# Patient Record
Sex: MALE
[De-identification: names, ages, dates, MRNs, and addresses within clinical notes are randomized per-mention and may not be internally consistent; named-entity substitution may affect disease eponyms.]

---

## 2018-02-15 ENCOUNTER — HOSPITAL ENCOUNTER (EMERGENCY)
Dept: HOSPITAL 14 - H.ER | Age: 38
Discharge: HOME | End: 2018-02-15
Payer: MEDICARE

## 2018-02-15 VITALS
HEART RATE: 126 BPM | TEMPERATURE: 97 F | OXYGEN SATURATION: 97 % | RESPIRATION RATE: 16 BRPM | DIASTOLIC BLOOD PRESSURE: 92 MMHG | SYSTOLIC BLOOD PRESSURE: 140 MMHG

## 2018-02-15 DIAGNOSIS — E87.6: ICD-10-CM

## 2018-02-15 DIAGNOSIS — R20.2: Primary | ICD-10-CM

## 2018-02-15 DIAGNOSIS — I10: ICD-10-CM

## 2018-02-15 DIAGNOSIS — F41.9: ICD-10-CM

## 2018-02-15 DIAGNOSIS — F31.9: ICD-10-CM

## 2018-02-15 DIAGNOSIS — R94.5: ICD-10-CM

## 2018-02-15 LAB
ALBUMIN SERPL-MCNC: 5 G/DL (ref 3.5–5)
ALBUMIN/GLOB SERPL: 1.4 {RATIO} (ref 1–2.1)
ALT SERPL-CCNC: 97 U/L (ref 21–72)
APTT BLD: 28 SECONDS (ref 25.6–37.1)
AST SERPL-CCNC: 157 U/L (ref 17–59)
BASOPHILS # BLD AUTO: 0 K/UL (ref 0–0.2)
BASOPHILS NFR BLD: 0.4 % (ref 0–2)
BUN SERPL-MCNC: 12 MG/DL (ref 9–20)
CALCIUM SERPL-MCNC: 9.3 MG/DL (ref 8.4–10.2)
EOSINOPHIL # BLD AUTO: 0 K/UL (ref 0–0.7)
EOSINOPHIL NFR BLD: 0.5 % (ref 0–4)
ERYTHROCYTE [DISTWIDTH] IN BLOOD BY AUTOMATED COUNT: 13.3 % (ref 11.5–14.5)
GFR NON-AFRICAN AMERICAN: > 60
HGB BLD-MCNC: 15 G/DL (ref 12–18)
INR PPP: 1 (ref 0.9–1.2)
LYMPHOCYTES # BLD AUTO: 0.8 K/UL (ref 1–4.3)
LYMPHOCYTES NFR BLD AUTO: 11.1 % (ref 20–40)
MAGNESIUM SERPL-MCNC: 1.6 MG/DL (ref 1.6–2.3)
MCH RBC QN AUTO: 31.2 PG (ref 27–31)
MCHC RBC AUTO-ENTMCNC: 34.5 G/DL (ref 33–37)
MCV RBC AUTO: 90.2 FL (ref 80–94)
MONOCYTES # BLD: 0.8 K/UL (ref 0–0.8)
MONOCYTES NFR BLD: 11.3 % (ref 0–10)
NEUTROPHILS # BLD: 5.8 K/UL (ref 1.8–7)
NEUTROPHILS NFR BLD AUTO: 76.7 % (ref 50–75)
NRBC BLD AUTO-RTO: 0.1 % (ref 0–0)
PLATELET # BLD: 199 K/UL (ref 130–400)
PMV BLD AUTO: 7.9 FL (ref 7.2–11.7)
PROTHROMBIN TIME: 11 SECONDS (ref 9.8–13.1)
RBC # BLD AUTO: 4.82 MIL/UL (ref 4.4–5.9)
WBC # BLD AUTO: 7.5 K/UL (ref 4.8–10.8)

## 2018-02-15 PROCEDURE — 99281 EMR DPT VST MAYX REQ PHY/QHP: CPT

## 2018-02-15 PROCEDURE — 80053 COMPREHEN METABOLIC PANEL: CPT

## 2018-02-15 PROCEDURE — 85025 COMPLETE CBC W/AUTO DIFF WBC: CPT

## 2018-02-15 PROCEDURE — 85610 PROTHROMBIN TIME: CPT

## 2018-02-15 PROCEDURE — 83735 ASSAY OF MAGNESIUM: CPT

## 2018-02-15 PROCEDURE — 84100 ASSAY OF PHOSPHORUS: CPT

## 2018-02-15 PROCEDURE — 85730 THROMBOPLASTIN TIME PARTIAL: CPT

## 2018-02-15 NOTE — ED PDOC
HPI: General Adult


Time Seen by Provider: 02/15/18 14:09


Chief Complaint (Nursing): Upper Extremity Problem/Injury


Chief Complaint (Provider): Tingling


History Per: Patient


History/Exam Limitations: no limitations


Onset/Duration Of Symptoms: Days (Tues)


Current Symptoms Are (Timing): Still Present


Additional Complaint(s): 





Pt. is an alcoholic.  Stopped drinking Monday.  Tues started getting tingling 

in fingers in both hands and both feet.  No chest pain, dyspnea, weakness, 

headaches, leg pain, dizziness, abd pain, back pain.  No drugs.  No injury.  

Vision is clear.  





Past Medical History


Vital Signs: 


 Last Vital Signs











Temp  97.0 F L  02/15/18 13:42


 


Pulse  126 H  02/15/18 13:42


 


Resp  16   02/15/18 13:42


 


BP  140/92 H  02/15/18 13:42


 


Pulse Ox  97   02/15/18 14:27














- Medical History


PMH: Bipolar Disorder, HTN


   Denies: Diabetes, Hepatitis, HIV, Seizures, Sexually Transmitted Disease


Other PMH: anxiety





- Surgical History


Surgical History: No Surg Hx





- Family History


Family History: States: Unknown Family Hx





- Social History


Current smoker - smoking cessation education provided: No


Alcohol: > 2 Drinks/Day





- Immunization History


Hx Tetanus Toxoid Vaccination: No


Hx Influenza Vaccination: No


Hx Pneumococcal Vaccination: No





- Home Medications


Home Medications: 


 Ambulatory Orders











 Medication  Instructions  Recorded


 


Escitalopram [Lexapro] 20 mg PO DAILY 03/07/17


 


Losartan/Hydrochlorothiazide 1 tab PO DAILY 03/07/17





[Losartan  





Potassium-Hydrochlorothiazide 12.5  





M]  


 


Naltrexone [Revia] 50 mg PO DAILY 03/07/17


 


Naproxen [Naprosyn] 500 mg PO DAILY 03/07/17


 


Zolpidem [Ambien] 5 mg PO HS PRN 03/07/17


 


amLODIPine [Norvasc] 5 mg PO DAILY 03/07/17














- Allergies


Allergies/Adverse Reactions: 


 Allergies











Allergy/AdvReac Type Severity Reaction Status Date / Time


 


No Known Allergies Allergy   Verified 03/07/17 18:47














Review of Systems


ROS Statement: Except As Marked, All Systems Reviewed And Found Negative





Physical Exam





- Reviewed


Nursing Documentation Reviewed: Yes


Vital Signs Reviewed: Yes





- Physical Exam


Appears: Positive for: Non-toxic, No Acute Distress


Head Exam: Positive for: ATRAUMATIC, NORMAL INSPECTION, NORMOCEPHALIC


Skin: Positive for: Normal Color, Warm, DRY


Eye Exam: Positive for: EOMI, Normal appearance, PERRL


ENT: Positive for: Normal ENT Inspection


Neck: Positive for: Normal, Painless ROM, Supple


Cardiovascular/Chest: Positive for: Chest Non Tender, Tachycardia (mild ).  

Negative for: Edema


Respiratory: Positive for: CNT, Normal Breath Sounds


Pulses-Radial (L): 2+


Pulses-Radial (R): 2+


Gastrointestinal/Abdominal: Positive for: Normal Exam, Bowel Sounds, Soft.  

Negative for: Tenderness


Back: Positive for: Normal Inspection.  Negative for: L CVA Tenderness, R CVA 

Tenderness


Extremity: Positive for: Normal ROM.  Negative for: Tenderness, Pedal Edema


Neurologic/Psych: Positive for: Alert, CNs II-XII, Oriented.  Negative for: 

Motor/Sensory Deficits, Aphasia, Facial Droop





- Laboratory Results


Result Diagrams: 


 02/15/18 14:57





 02/15/18 14:57


Interpretation Of Abn Labs: 3.2 k





- ECG


O2 Sat by Pulse Oximetry: 97


Pulse Ox Interpretation: Normal





- Progress


ED Course And Treament: 





1552:  Stable.  AAOx3.  Pain free.  Liver enzymes elevated from chronic etoh 

abuse.  Pt. aware to fu with pcp and gi.  Tolerated po. 





Disposition





- Clinical Impression


Clinical Impression: 


 Hypokalemia, Elevated liver enzymes, Paresthesia








- Patient ED Disposition


Is Patient to be Admitted: No


Counseled Patient/Family Regarding: Studies Performed, Diagnosis, Need For 

Followup





- Disposition


Referrals: 


[facility] [Outside] - 02/16/18


Tyler Diego MD [Medical Doctor] - 02/16/18


Disposition: Routine/Home


Disposition Time: 15:30


Condition: STABLE


Additional Instructions: 


You have elevated liver enzymes.  Make sure to see the doctor without fail in 3 

days.  Return if not better in 3 days. 


Instructions:  Paresthesias (DC), Hypokalemia (DC)

## 2018-06-14 ENCOUNTER — HOSPITAL ENCOUNTER (INPATIENT)
Dept: HOSPITAL 14 - H.ER | Age: 38
LOS: 5 days | Discharge: HOME | DRG: 897 | End: 2018-06-19
Attending: INTERNAL MEDICINE | Admitting: INTERNAL MEDICINE
Payer: MEDICARE

## 2018-06-14 DIAGNOSIS — E87.6: ICD-10-CM

## 2018-06-14 DIAGNOSIS — E78.5: ICD-10-CM

## 2018-06-14 DIAGNOSIS — F10.239: Primary | ICD-10-CM

## 2018-06-14 DIAGNOSIS — K14.0: ICD-10-CM
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
## 2018-06-14 DIAGNOSIS — E78.00: ICD-10-CM

## 2018-06-14 DIAGNOSIS — I10: ICD-10-CM

## 2018-06-14 DIAGNOSIS — R56.9: ICD-10-CM

## 2018-06-14 DIAGNOSIS — F31.9: ICD-10-CM

## 2018-06-14 LAB
ALBUMIN SERPL-MCNC: 4.9 G/DL (ref 3.5–5)
ALBUMIN/GLOB SERPL: 1.3 {RATIO} (ref 1–2.1)
ALT SERPL-CCNC: 64 U/L (ref 21–72)
APTT BLD: 26.9 SECONDS (ref 25.6–37.1)
AST SERPL-CCNC: 116 U/L (ref 17–59)
BASOPHILS # BLD AUTO: 0 K/UL (ref 0–0.2)
BASOPHILS NFR BLD: 0.3 % (ref 0–2)
BUN SERPL-MCNC: 9 MG/DL (ref 9–20)
CALCIUM SERPL-MCNC: 9.4 MG/DL (ref 8.4–10.2)
EOSINOPHIL # BLD AUTO: 0 K/UL (ref 0–0.7)
EOSINOPHIL NFR BLD: 0 % (ref 0–4)
ERYTHROCYTE [DISTWIDTH] IN BLOOD BY AUTOMATED COUNT: 16.6 % (ref 11.5–14.5)
GFR NON-AFRICAN AMERICAN: > 60
HGB BLD-MCNC: 14.6 G/DL (ref 12–18)
HYPOCHROMIC: SLIGHT
INR PPP: 1 (ref 0.9–1.2)
LYMPHOCYTE: 4 % (ref 20–50)
LYMPHOCYTES # BLD AUTO: 0.2 K/UL (ref 1–4.3)
LYMPHOCYTES NFR BLD AUTO: 2.6 % (ref 20–40)
MCH RBC QN AUTO: 31.1 PG (ref 27–31)
MCHC RBC AUTO-ENTMCNC: 34.6 G/DL (ref 33–37)
MCV RBC AUTO: 89.8 FL (ref 80–94)
MONOCYTE: 6 % (ref 0–10)
MONOCYTES # BLD: 0.5 K/UL (ref 0–0.8)
MONOCYTES NFR BLD: 6 % (ref 0–10)
NEUTROPHILS # BLD: 7.5 K/UL (ref 1.8–7)
NEUTROPHILS NFR BLD AUTO: 86 % (ref 42–75)
NEUTROPHILS NFR BLD AUTO: 91.1 % (ref 50–75)
NEUTS BAND NFR BLD: 4 % (ref 0–2)
NRBC BLD AUTO-RTO: 0 % (ref 0–0)
PLATELET # BLD EST: NORMAL 10*3/UL
PLATELET # BLD: 132 K/UL (ref 130–400)
PMV BLD AUTO: 7.4 FL (ref 7.2–11.7)
PROTHROMBIN TIME: 10.8 SECONDS (ref 9.8–13.1)
RBC # BLD AUTO: 4.71 MIL/UL (ref 4.4–5.9)
SMUDGE CELLS # BLD: PRESENT 10*3/UL
TOTAL CELLS COUNTED BLD: 100
TOXIC GRANULES BLD QL SMEAR: PRESENT
WBC # BLD AUTO: 8.3 K/UL (ref 4.8–10.8)

## 2018-06-14 NOTE — ED PDOC
HPI: Seizure


Time Seen by Provider: 06/14/18 15:44


Chief Complaint (Nursing): Seizure


Chief Complaint (Provider): Seizure


History Per: Patient


History/Exam Limitations: no limitations


Number Of Seizures: One


Length Of Seizures (Duration): Unknown


Associated Symptoms: Bit Tongue


Additional Complaint(s): 


38 year old male was reportedly found in the bathroom of a police station. 

Patient appears to be amnestic and states the last thing he remembers is using 

the restroom and thought to have had a seizure. Denies prior history of 

seizure. In addition, patient complains of headache and throat pain. Patient 

admits to drinking daily and does not remember when his last drink was.  





PMD: None Provided





Past Medical History


Reviewed: Historical Data, Nursing Documentation, Vital Signs


Vital Signs: 


 Last Vital Signs











Temp  99 F   06/14/18 15:33


 


Pulse  112 H  06/14/18 15:33


 


Resp  19   06/14/18 15:33


 


BP  148/87   06/14/18 15:33


 


Pulse Ox  98   06/14/18 17:48














- Medical History


PMH: Bipolar Disorder, HTN


   Denies: Diabetes, Hepatitis, HIV, Seizures, Sexually Transmitted Disease





- Surgical History


Surgical History: No Surg Hx





- Family History


Family History: States: Unknown Family Hx





- Social History


Alcohol: > 2 Drinks/Day


Drugs: Denies





- Immunization History


Hx Tetanus Toxoid Vaccination: No


Hx Influenza Vaccination: No


Hx Pneumococcal Vaccination: No





- Home Medications


Home Medications: 


 Ambulatory Orders











 Medication  Instructions  Recorded


 


Escitalopram [Lexapro] 20 mg PO DAILY 03/07/17


 


Losartan/Hydrochlorothiazide 1 tab PO DAILY 03/07/17





[Losartan  





Potassium-Hydrochlorothiazide 12.5  





M]  


 


Naltrexone [Revia] 50 mg PO DAILY 03/07/17


 


Naproxen [Naprosyn] 500 mg PO DAILY 03/07/17


 


Zolpidem [Ambien] 5 mg PO HS PRN 03/07/17


 


amLODIPine [Norvasc] 5 mg PO DAILY 03/07/17














- Allergies


Allergies/Adverse Reactions: 


 Allergies











Allergy/AdvReac Type Severity Reaction Status Date / Time


 


No Known Allergies Allergy   Verified 06/14/18 15:33














Review of Systems


ROS Statement: Except As Marked, All Systems Reviewed And Found Negative


ENT: Positive for: Throat Pain


Neurological: Positive for: Seizures, Headache





Physical Exam





- Reviewed


Nursing Documentation Reviewed: Yes


Vital Signs Reviewed: Yes





- Physical Exam


Appears: Positive for: Non-toxic, No Acute Distress


Head Exam: Positive for: NORMAL INSPECTION (no scalp hematoma)


Skin: Positive for: Normal Color, Warm, Dry


Eye Exam: Positive for: Normal appearance, EOMI, PERRL


ENT: Positive for: Other (contusion to the right tongue; admedus tonsillar 

pillars (right > left) with erythema)


Neck: Positive for: Normal, Painless ROM, Supple


Cardiovascular/Chest: Positive for: Tachycardia


Respiratory: Positive for: Normal Breath Sounds.  Negative for: Respiratory 

Distress


Gastrointestinal/Abdominal: Positive for: Normal Exam, Soft.  Negative for: 

Tenderness


Back: Positive for: Normal Inspection.  Negative for: L CVA Tenderness, R CVA 

Tenderness, Vertebral Tenderness


Extremity: Positive for: Normal ROM.  Negative for: Pedal Edema, Deformity


Neurologic/Psych: Positive for: Alert, Oriented, Other (Poor insight and 

appears post-ictal)





- Laboratory Results


Result Diagrams: 


 06/14/18 16:30





 06/14/18 16:30





- ECG


ECG Rhythm: Positive for: Sinus Tachycardia


Rate: 112


O2 Sat by Pulse Oximetry: 98 (RA)


Pulse Ox Interpretation: Normal





Medical Decision Making


Medical Decision Making: 


-- Workup for possible new onset seizure. 


-- evaluate for pharyngitis, rule out PTA. 





Time: 1618


CXR RESULTS 


FINDINGS:





LUNGS:


The lungs are clear.





PLEURA:


No significant pleural effusion identified, no pneumothorax apparent.





CARDIOVASCULAR:


Normal.





OSSEOUS STRUCTURES:


No significant abnormalities.





VISUALIZED UPPER ABDOMEN:


Normal.





OTHER FINDINGS:


There is severe gaseous distension of the left hemicolon.





IMPRESSION:


No active pulmonary disease.





Severe gaseous distension of the left hemicolon.  Clinical follow-up is advised.


*************************************************************


Time: 1641 


HEAD CT RESULTS


FINDINGS:





HEMORRHAGE:


No intracranial hemorrhage. 





BRAIN:


Evaluation is limited due to extensive beam hardening artifacts. Gray-white 

matter differentiation is preserved.  There is no mass, mass effect or abnormal 

extra-axial fluid collection.  There is no territorial infarction. 





VENTRICLES:


There is mild is advanced global parenchymal volume loss and proportionate 

enlargement of the ventricles and cortical sulci. 





CALVARIUM:


There is no calvarial fracture or extracranial soft tissue swelling.





PARANASAL SINUSES:


Predominantly clear.





MASTOID AIR CELLS:


Predominantly clear.





OTHER FINDINGS:


None.





IMPRESSION:


Limited evaluation due to extensive beam hardening artifacts.





No acute intracranial abnormality.  





Mild age advanced global parenchymal volume loss.


**************************************************************


Time: 1645


CERVICAL CT SPINE RESULTS 


FINDINGS:





VERTEBRAE:


There is mild reversal of normal cervical lordosis with cervical kyphosis. 

Vertebral alignment is normal. There is no acute fracture or traumatic anterior 

listhesis. The craniocervical junction is normal.  The atlantoaxial joint is 

normal.  Bone mineralization is normal. 





DISCS/SPINAL CANAL/NEURAL FORAMINA:


There is mild multilevel degenerative disc disease due to combination of disc 

osteophyte complexes, uncovertebral joint hypertrophy and multilevel facet 

arthropathy without significant neural foraminal or spinal canal stenosis. 





PARASPINAL SOFT TISSUES:


Normal. 





No prevertebral soft tissue thickening. 





OTHER FINDINGS:


No apical pneumothorax.





IMPRESSION:


No acute fracture or traumatic anterior listhesis.





Mild reversal of normal cervical lordosis which may be positional, related to 

muscle spasm or ligamentous injury.





_________________________________________________________________


Scribe Attestation:


Documented by Vida Breaux, acting as a scribe for Dr. Michael Crystal III. 





Provider Scribe Attestation:


All medical record entries made by the Scribe were at my direction and 

personally dictated by me. I have reviewed the chart and agree that the record 

accurately reflects my personal performance of the history, physical exam, 

medical decision making, and the department course for this patient. I have 

also personally directed, reviewed and agree with the discharge instructions 

and disposition. 








Disposition





- Disposition


Forms:  [facility] (English)

## 2018-06-14 NOTE — CT
PROCEDURE:  CT HEAD WITHOUT CONTRAST.



HISTORY:

r/o ICH



COMPARISON:

None available. 



TECHNIQUE:

Axial computed tomography images were obtained through the head/brain 

without intravenous contrast.  



Radiation dose:



Total exam DLP =  1063.82 mGy-cm.



This CT exam was performed using one or more of the following dose 

reduction techniques: Automated exposure control, adjustment of the 

mA and/or kV according to patient size, and/or use of iterative 

reconstruction technique.



FINDINGS:



HEMORRHAGE:

No intracranial hemorrhage. 



BRAIN:

Evaluation is limited due to extensive beam hardening artifacts. 

Gray-white matter differentiation is preserved.  There is no mass, 

mass effect or abnormal extra-axial fluid collection.  There is no 

territorial infarction. 



VENTRICLES:

There is mild is advanced global parenchymal volume loss and 

proportionate enlargement of the ventricles and cortical sulci. 



CALVARIUM:

There is no calvarial fracture or extracranial soft tissue swelling.



PARANASAL SINUSES:

Predominantly clear.



MASTOID AIR CELLS:

Predominantly clear.



OTHER FINDINGS:

None.



IMPRESSION:

Limited evaluation due to extensive beam hardening artifacts.



No acute intracranial abnormality.  



Mild age advanced global parenchymal volume loss.

## 2018-06-15 LAB
ALBUMIN SERPL-MCNC: 4.5 G/DL (ref 3.5–5)
ALBUMIN/GLOB SERPL: 1.2 {RATIO} (ref 1–2.1)
ALT SERPL-CCNC: 56 U/L (ref 21–72)
AST SERPL-CCNC: 111 U/L (ref 17–59)
BUN SERPL-MCNC: 7 MG/DL (ref 9–20)
CALCIUM SERPL-MCNC: 9.3 MG/DL (ref 8.4–10.2)
ERYTHROCYTE [DISTWIDTH] IN BLOOD BY AUTOMATED COUNT: 16.1 % (ref 11.5–14.5)
GFR NON-AFRICAN AMERICAN: > 60
HDLC SERPL-MCNC: 140 MG/DL (ref 30–70)
HGB BLD-MCNC: 14.5 G/DL (ref 12–18)
LDLC SERPL-MCNC: 92 MG/DL (ref 0–129)
MCH RBC QN AUTO: 30.9 PG (ref 27–31)
MCHC RBC AUTO-ENTMCNC: 34 G/DL (ref 33–37)
MCV RBC AUTO: 90.9 FL (ref 80–94)
PLATELET # BLD: 130 K/UL (ref 130–400)
RBC # BLD AUTO: 4.71 MIL/UL (ref 4.4–5.9)
T3: 0.86 NMOL/L (ref 1.49–2.6)
T4 SERPL-MCNC: 6.42 UG/DL (ref 5.5–11)
VIT B12 SERPL-MCNC: 434 PG/ML (ref 239–931)
WBC # BLD AUTO: 6.2 K/UL (ref 4.8–10.8)

## 2018-06-15 RX ADMIN — POTASSIUM CHLORIDE SCH MEQ: 20 TABLET, EXTENDED RELEASE ORAL at 09:07

## 2018-06-15 RX ADMIN — Medication SCH ML: at 16:27

## 2018-06-15 RX ADMIN — PHENYTOIN SODIUM SCH MG: 50 INJECTION INTRAMUSCULAR; INTRAVENOUS at 08:13

## 2018-06-15 RX ADMIN — OMEGA-3-ACID ETHYL ESTERS SCH GM: 900 CAPSULE, LIQUID FILLED ORAL at 21:14

## 2018-06-15 RX ADMIN — POTASSIUM CHLORIDE SCH MEQ: 20 TABLET, EXTENDED RELEASE ORAL at 16:28

## 2018-06-15 RX ADMIN — PHENYTOIN SODIUM SCH MG: 50 INJECTION INTRAMUSCULAR; INTRAVENOUS at 16:27

## 2018-06-15 RX ADMIN — OMEGA-3-ACID ETHYL ESTERS SCH GM: 900 CAPSULE, LIQUID FILLED ORAL at 08:10

## 2018-06-15 RX ADMIN — Medication SCH ML: at 09:08

## 2018-06-15 RX ADMIN — PHENYTOIN SODIUM SCH MG: 50 INJECTION INTRAMUSCULAR; INTRAVENOUS at 01:15

## 2018-06-15 RX ADMIN — Medication SCH ML: at 21:16

## 2018-06-15 NOTE — CT
PROCEDURE:  CT NECK WITH  CONTRAST



HISTORY:

dysphagia. throat pain



COMPARISON:

Cervical spine CT without contrast 06/14/2018.



TECHNIQUE:

CT of the neck with intravenous contrast. Coronal and sagittal 

reformats generated.



Intravenous contrast dose: Omnipaque 300, 80 cc



Radiation dose: .29 mGy-cm



This CT exam was performed using one or more of the following dose 

reduction techniques: Automated exposure control, adjustment of the 

mA and/or kV according to patient size, and/or use of iterative 

reconstruction technique.



FINDINGS:



NASOPHARYNX:

Unremarkable.



SUPRAHYOID NECK:

Limited motion artifact obscures the upper oropharynx. Unremarkable, 

oral cavity, parapharyngeal space and retropharyngeal space.



INFRAHYOID NECK:

Unremarkable larynx, hypopharynx, and supraglottic space. Vocal cords 

intact.



MASS:

None identified.



GLANDS:

Parotid and submandibular glands unremarkable. Normal size thyroid 

gland, without nodule.



LYMPH NODES:

Mild bilateral lymphadenopathy is appreciated including a 2.4 x 1.3 

cm right jugular digastric lymph node and a left jugulodigastric 

lymph node measuring 2.5 x 1.0.



CERVICAL SPINE:

Reversal cervical curvature with limited anterior spondylosis at the 

mid cervical spine. See separate cervical spine CT report 06/14/2016. 



VASCULAR STRUCTURES:

Unremarkable.



OTHER FINDINGS:

None.



IMPRESSION:

Limited lymphadenopathy as discussed above, at the bilateral 

jugulodigastric distribution. Limited motion artifact obscures the 

upper or fracture of the remainder of the supra and infrahyoid neck 

appear unremarkable no gross mass identified. Reversal of cervical 

curvature is appreciated.

## 2018-06-15 NOTE — CP.PCM.CON
History of Present Illness





- History of Present Illness


History of Present Illness: 





Consultation for possible syncope vs. seizures / hx of cardiac surgery 





HPI:








Review of Systems





- Review of Systems


Systems not reviewed;Unavailable: Acuity of Condition





- Constitutional


Constitutional: As Per HPI





- EENT


Eyes: As Per HPI


Ears: As Per HPI


Nose/Mouth/Throat: As Per HPI





- Cardiovascular


Cardiovascular: As Per HPI





- Respiratory


Respiratory: As Per HPI





- Gastrointestinal


Gastrointestinal: As Per HPI





- Genitourinary


Genitourinary: As Per HPI





- Reproductive: Male


Reproductive:Male: As Per HPI





- Musculoskeletal


Musculoskeletal: As Per HPI





- Integumentary


Integumentary: As Per HPI





- Neurological


Neurological: As Per HPI





- Psychiatric


Psychiatric: As Per HPI





- Endocrine


Endocrine: As Per HPI





- Hematologic/Lymphatic


Hematologic: As Per HPI





Past Patient History





- Infectious Disease


Hx of Infectious Diseases: None





- Past Social History


Smoking Status: Never Smoked





- CARDIAC


Hx Cardiac Disorders: Yes


Hx Hypercholesterolemia: Yes


Hx Hypertension: Yes





- PULMONARY


Hx Tuberculosis: No





- NEUROLOGICAL


Hx Seizures: Yes (new onset 6/14/18)





- HEMATOLOGICAL/ONCOLOGICAL


Hx Human Immunodeficiency Virus (HIV): No





- MUSCULOSKELETAL/RHEUMATOLOGICAL


Hx Falls: Yes





- GENITOURINARY/GYNECOLOGICAL


Hx Sexually Transmitted Disorders: No





- PSYCHIATRIC


Hx Anxiety: Yes


Hx Bipolar Disorder: Yes


Hx Substance Use: No





- SURGICAL HISTORY


Hx Surgeries: Yes


Other/Comment: cardiac sx as a child





- ANESTHESIA


Hx Anesthesia: Yes


Hx Anesthesia Reactions: No





Meds


Allergies/Adverse Reactions: 


 Allergies











Allergy/AdvReac Type Severity Reaction Status Date / Time


 


No Known Allergies Allergy   Verified 06/14/18 15:33














- Medications


Medications: 


 Current Medications





Acetaminophen (Tylenol 325mg Tab)  650 mg PO Q6 PRN


   PRN Reason: pain,temp


   Last Admin: 06/14/18 22:26 Dose:  650 mg


Amlodipine Besylate (Norvasc)  10 mg PO DAILY [facility]


   Last Admin: 06/15/18 08:11 Dose:  10 mg


Atorvastatin Calcium (Lipitor)  40 mg PO DAILY [facility]


   Last Admin: 06/15/18 09:09 Dose:  40 mg


Chlordiazepoxide (Librium)  50 mg PO Q8 [facility]


   Last Admin: 06/15/18 10:13 Dose:  50 mg


Clonazepam (Klonopin)  0.5 mg PO HS [facility]


   Last Admin: 06/14/18 22:26 Dose:  0.5 mg


Hydrochlorothiazide (Microzide)  12.5 mg PO DAILY [facility]


   Last Admin: 06/15/18 08:11 Dose:  12.5 mg


Sodium Chloride (Sodium Chloride 0.9%)  1,000 mls @ 125 mls/hr IV .Q8H [facility]


   Stop: 06/15/18 21:59


   Last Admin: 06/15/18 13:03 Dose:  125 mls/hr


Lidocaine HCl (Lidocaine 2% Viscous)  15 ml PO Q6 [facility]


   Stop: 06/15/18 22:01


   Last Admin: 06/15/18 10:12 Dose:  15 ml


Losartan Potassium (Cozaar)  100 mg PO DAILY [facility]


   Last Admin: 06/15/18 08:11 Dose:  100 mg


Omega-3-Acid Ethyl Esters (Lovaza)  2 gm PO Q12 [facility]


   Last Admin: 06/15/18 08:10 Dose:  2 gm


Phenytoin (Dilantin)  100 mg IVP Q8 [facility]


   Last Admin: 06/15/18 08:13 Dose:  100 mg


Potassium Chloride (K-Dur 20 Meq Er Tab)  20 meq PO BID [facility]


   Last Admin: 06/15/18 09:07 Dose:  20 meq


Risperidone (Risperdal Tab)  2 mg PO DAILY [facility]


   Last Admin: 06/15/18 08:11 Dose:  2 mg


Saliva Substitute (First Magic Mouthwash)  10 ml PO Q6 [facility]


   Last Admin: 06/15/18 09:08 Dose:  10 ml











Physical Exam





- Constitutional


Appears: Well





- Head Exam


Head Exam: ATRAUMATIC, NORMAL INSPECTION, NORMOCEPHALIC





- Eye Exam


Eye Exam: EOMI, Normal appearance, PERRL


Pupil Exam: NORMAL ACCOMODATION, PERRL





- ENT Exam


ENT Exam: Mucous Membranes Moist, Normal Exam





- Neck Exam


Neck exam: Positive for: Normal Inspection





- Respiratory Exam


Respiratory Exam: Clear to Auscultation Bilateral, NORMAL BREATHING PATTERN





- Cardiovascular Exam


Cardiovascular Exam: REGULAR RHYTHM, +S1





- GI/Abdominal Exam


GI & Abdominal Exam: Normal Bowel Sounds, Soft.  absent: Tenderness





- Extremities Exam


Extremities exam: Positive for: normal inspection





- Back Exam


Back exam: NORMAL INSPECTION





- Neurological Exam


Neurological exam: Alert, CN II-XII Intact, Normal Gait, Oriented x3, Reflexes 

Normal





- Psychiatric Exam


Psychiatric exam: Normal Affect, Normal Mood





- Skin


Skin Exam: Dry, Intact, Normal Color, Warm





Results





- Vital Signs


Recent Vital Signs: 


 Last Vital Signs











Temp  98.6 F   06/15/18 07:58


 


Pulse  83   06/15/18 08:11


 


Resp  18   06/15/18 07:58


 


BP  140/95 H  06/15/18 08:11


 


Pulse Ox  98   06/15/18 07:58














- Labs


Result Diagrams: 


 06/15/18 04:50





 06/15/18 04:50


Labs: 


 Laboratory Results - last 24 hr











  06/14/18 06/14/18 06/14/18





  15:56 16:30 16:30


 


WBC    8.3


 


RBC    4.71


 


Hgb    14.6


 


Hct    42.3


 


MCV    89.8


 


MCH    31.1 H


 


MCHC    34.6


 


RDW    16.6 H


 


Plt Count    132


 


MPV    7.4


 


Neut % (Auto)    91.1 H


 


Lymph % (Auto)    2.6 L


 


Mono % (Auto)    6.0


 


Eos % (Auto)    0.0


 


Baso % (Auto)    0.3


 


Neut # (Auto)    7.5 H


 


Lymph # (Auto)    0.2 L


 


Mono # (Auto)    0.5


 


Eos # (Auto)    0.0


 


Baso # (Auto)    0.0


 


Neutrophils % (Manual)    86 H


 


Band Neutrophils %    4 H


 


Lymphocytes % (Manual)    4 L


 


Monocytes % (Manual)    6


 


Smudge Cells    Present


 


Toxic Granulation    Present


 


Platelet Estimate    Normal


 


Hypochromasia (manual)    Slight


 


PT   


 


INR   


 


APTT   


 


Sodium   131 L 


 


Potassium   3.3 L 


 


Chloride   90 L 


 


Carbon Dioxide   28 


 


Anion Gap   16 


 


BUN   9 


 


Creatinine   0.9 


 


Est GFR ( Amer)   > 60 


 


Est GFR (Non-Af Amer)   > 60 


 


POC Glucose (mg/dL)  196 H  


 


Random Glucose   167 H 


 


Calcium   9.4 


 


Total Bilirubin   3.8 H 


 


AST   116 H D 


 


ALT   64 


 


Alkaline Phosphatase   108 


 


Total Creatine Kinase   


 


Troponin I   0.1220 H* 


 


Total Protein   8.8 H 


 


Albumin   4.9 


 


Globulin   3.9 


 


Albumin/Globulin Ratio   1.3 


 


Triglycerides   


 


Cholesterol   


 


LDL Cholesterol Direct   


 


HDL Cholesterol   


 


Vitamin B12   


 


Thyroxine (T4)   


 


Total T3   


 


TSH 3rd Generation   


 


Urine Opiates Screen   


 


Urine Methadone Screen   


 


Ur Barbiturates Screen   


 


Ur Phencyclidine Scrn   


 


Ur Amphetamines Screen   


 


U Benzodiazepines Scrn   


 


U Oth Cocaine Metabols   


 


U Cannabinoids Screen   


 


Alcohol, Quantitative   < 10 














  06/14/18 06/14/18 06/14/18





  16:30 16:50 23:03


 


WBC   


 


RBC   


 


Hgb   


 


Hct   


 


MCV   


 


MCH   


 


MCHC   


 


RDW   


 


Plt Count   


 


MPV   


 


Neut % (Auto)   


 


Lymph % (Auto)   


 


Mono % (Auto)   


 


Eos % (Auto)   


 


Baso % (Auto)   


 


Neut # (Auto)   


 


Lymph # (Auto)   


 


Mono # (Auto)   


 


Eos # (Auto)   


 


Baso # (Auto)   


 


Neutrophils % (Manual)   


 


Band Neutrophils %   


 


Lymphocytes % (Manual)   


 


Monocytes % (Manual)   


 


Smudge Cells   


 


Toxic Granulation   


 


Platelet Estimate   


 


Hypochromasia (manual)   


 


PT  10.8  


 


INR  1.0  


 


APTT  26.9  


 


Sodium   


 


Potassium   


 


Chloride   


 


Carbon Dioxide   


 


Anion Gap   


 


BUN   


 


Creatinine   


 


Est GFR ( Amer)   


 


Est GFR (Non-Af Amer)   


 


POC Glucose (mg/dL)   


 


Random Glucose   


 


Calcium   


 


Total Bilirubin   


 


AST   


 


ALT   


 


Alkaline Phosphatase   


 


Total Creatine Kinase   


 


Troponin I    0.0830


 


Total Protein   


 


Albumin   


 


Globulin   


 


Albumin/Globulin Ratio   


 


Triglycerides   


 


Cholesterol   


 


LDL Cholesterol Direct   


 


HDL Cholesterol   


 


Vitamin B12   


 


Thyroxine (T4)   


 


Total T3   


 


TSH 3rd Generation   


 


Urine Opiates Screen   Negative 


 


Urine Methadone Screen   Negative 


 


Ur Barbiturates Screen   Negative 


 


Ur Phencyclidine Scrn   Negative 


 


Ur Amphetamines Screen   Negative 


 


U Benzodiazepines Scrn   Negative 


 


U Oth Cocaine Metabols   Negative 


 


U Cannabinoids Screen   Negative 


 


Alcohol, Quantitative   














  06/15/18 06/15/18 06/15/18





  04:50 04:50 05:47


 


WBC  6.2  


 


RBC  4.71  


 


Hgb  14.5  


 


Hct  42.8  


 


MCV  90.9  


 


MCH  30.9  


 


MCHC  34.0  


 


RDW  16.1 H  


 


Plt Count  130  


 


MPV   


 


Neut % (Auto)   


 


Lymph % (Auto)   


 


Mono % (Auto)   


 


Eos % (Auto)   


 


Baso % (Auto)   


 


Neut # (Auto)   


 


Lymph # (Auto)   


 


Mono # (Auto)   


 


Eos # (Auto)   


 


Baso # (Auto)   


 


Neutrophils % (Manual)   


 


Band Neutrophils %   


 


Lymphocytes % (Manual)   


 


Monocytes % (Manual)   


 


Smudge Cells   


 


Toxic Granulation   


 


Platelet Estimate   


 


Hypochromasia (manual)   


 


PT   


 


INR   


 


APTT   


 


Sodium   134 


 


Potassium   2.9 L 


 


Chloride   96 L 


 


Carbon Dioxide   25 


 


Anion Gap   16 


 


BUN   7 L 


 


Creatinine   0.8 


 


Est GFR ( Amer)   > 60 


 


Est GFR (Non-Af Amer)   > 60 


 


POC Glucose (mg/dL)   


 


Random Glucose   90 


 


Calcium   9.3 


 


Total Bilirubin   3.9 H 


 


AST   111 H 


 


ALT   56 


 


Alkaline Phosphatase   93 


 


Total Creatine Kinase   665 H 


 


Troponin I    0.0430


 


Total Protein   8.2 


 


Albumin   4.5 


 


Globulin   3.7 


 


Albumin/Globulin Ratio   1.2 


 


Triglycerides   78 


 


Cholesterol   275 H 


 


LDL Cholesterol Direct   92 


 


HDL Cholesterol   140 H 


 


Vitamin B12   434 


 


Thyroxine (T4)   6.42 


 


Total T3   0.859 L 


 


TSH 3rd Generation   3.94 


 


Urine Opiates Screen   


 


Urine Methadone Screen   


 


Ur Barbiturates Screen   


 


Ur Phencyclidine Scrn   


 


Ur Amphetamines Screen   


 


U Benzodiazepines Scrn   


 


U Oth Cocaine Metabols   


 


U Cannabinoids Screen   


 


Alcohol, Quantitative   














Assessment & Plan


(1) Status post cardiac surgery


Assessment and Plan: 


echo 


Status: Acute   





(2) New onset seizure


Assessment and Plan: 


orthostatics 


Status: Acute   





(3) HTN (hypertension)


Status: Chronic   





(4) Dyslipidemia


Status: Acute

## 2018-06-15 NOTE — CON
DATE:  06/15/2018



CHIEF COMPLAINT:  New-onset seizure.



HISTORY OF PRESENT ILLNESS:  This is a 38-year-old man with history of

hypertension, bipolar disorder, chronic ETOH abuse, had new-onset seizure

when he went to the bathroom and _____ his head was starting to hurt and do

not remember what had happened that afterwards.  Apparently, he noticed

that his tongue was bitten.  His mother does report he drinks consistently

for months of beer and liquor and binge drinks and then stops suddenly.  No

history of any seizures.  Febrile seizures as a child.  No history of

meningitis or trauma to the brain.



PAST MEDICAL HISTORY:  As above.



SOCIAL HISTORY:  Denies any smoking and drugs.  Drinks alcohol daily, binge

drink episodes.  Last binge drink was one episode prior to the seizure

yesterday.



FAMILY HISTORY:  Noncontributory.



ALLERGIES:  NO KNOWN DRUG ALLERGIES.



MEDICATIONS:  Reviewed by nurse reconciliation sheet.



REVIEW OF SYSTEMS:  A 14-point review of systems negative except in the

HPI.



LABORATORY DATA:  Sodium is 134, potassium 2.9, chloride 96, carbon dioxide

25, BUN of 7, creatinine 0.8, random glucose 90.



PHYSICAL EXAMINATION:

VITAL SIGNS:  Temperature 98, pulse rate 95, blood pressure 125/80,

respiratory rate 20, oxygen saturation 99% by room air.

GENERAL:  The patient is sitting up in the bed, in no acute distress.

HEENT:  Atraumatic and normocephalic.  PERRLA.  Extraocular muscles are

intact.  Tongue:  There is increased swelling on the right side with

hematoma.

NECK:  Supple.  No JVD.  No adenopathy noted.

LUNGS:  Clear to auscultation.  No adventitious sounds.

ABDOMEN:  Soft, nontender and nondistended.  Bowel sounds present.

EXTREMITIES:  No clubbing, no cyanosis.  Peripheral pulses 2+ bilaterally.

HEART:  S1 and S2.  Normal rate and rhythm.  No murmur, rubs, or gallops.

NEUROLOGIC:  The patient is alert and oriented to person, place, month, and

year.  Speech is fluent without any errors.  Cranial nerves II through XII

are intact.  Motor exam:  Moves all extremities equally.  No pronator drift

seen.   Sensory exam:  Light touch, pinprick, proprioception, and vibration

are intact.  DTRs are 2+ throughout.  Coordination:  Finger-to-nose intact.

Gait is deferred for now.







ASSESSMENT AND PLAN:  This is a 38-year-old man with past medical history

of hypertension, bipolar disorder, chronic drinker, and has binge drinking

episodes, came with a new-onset seizure, likely related to alcohol

withdrawal and provoked by alcohol rather than focal seizure itself.  CAT

scan of the head showed no acute intracranial abnormality.



RECOMMENDATIONS:  At this time, I recommend:

1.  No _____ for now.

2.  Monitor electrolytes and correct accordingly.

3.  Thiamine 100 mg p.o. daily.

4.  EEG.

5.  Follow up with his primary care doctor.



Thank you for this consult.







__________________________________________

Omid San MD



DD:  06/15/2018 17:57:53

DT:  06/15/2018 18:49:07

Job # [id number]

## 2018-06-15 NOTE — CARD
--------------- APPROVED REPORT --------------





EKG Measurement

Heart [id number]

NH [id number]

[id number]

[id number]

[id number]



<Conclusion>

Normal sinus rhythm

Nonspecific T wave abnormality

Abnormal ECG

## 2018-06-15 NOTE — CP.PCM.HP
History of Present Illness





- History of Present Illness


History of Present Illness: 





CC: new onset seizures





HPI:  39 y/o man w/ pmh of HTN and bipolar disorder presented to the ED s/p new 

onset seizures.  Patient has no recollection of event.  Prior to seizure 

patient reports he went to police precinct because he wanted help.  Patient 

says he then went to the bathroom and felt his head started to hurt and then no 

memory of what happened afterwards.  Patient awoke noticed that he bit his 

tongue but denies urinary or bowel incontinence.  Patient denies previous 

history of seizure.  Patient reports drinking alcohol daily consisting of beer 

and some liquor.  Patient reports binge drinking the day before the seizure 

occurred.  Patient currently reports drowsiness and tongue pain after biting 

his tongue during seizure episode.  The patient currently denies headaches, 

chest pain, SOB, abdominal pain, nausea, vomiting, diarrhea, dysuria, or fever.





PMD: Dr. Gamez


PMH: HTN and bipolar disorder


meds: see med list


allergies: NKDA


PSH: cardiac surgery as an infant


Fam: mother HTN, father murdered in [place] when patient was 16, 

unsure of family history of seizure/epilepsy


SOC: denies smoking and drugs, drinking alcohol daily with binge drinking 

episodes (last binge drink episode 1 day prior to seizure)





ROS: 12 points assessed and negative unless otherwise reported in HPI








Patient seen and examined this morning at bedside w/ Dr. Jiménez








Present on Admission





- Present on Admission


Any Indicators Present on Admission: No


History of DVT/PE: No


History of Uncontrolled Diabetes: No


Urinary Catheter: No


Decubitus Ulcer Present: No





Review of Systems





- Review of Systems


All systems: reviewed and no additional remarkable complaints except





- Constitutional


Constitutional: absent: Chills, Fever





- EENT


Eyes: absent: Change in Vision


Additional comments: 





tongue pain





- Cardiovascular


Cardiovascular: absent: Chest Pain





- Respiratory


Respiratory: absent: Dyspnea





- Gastrointestinal


Gastrointestinal: absent: Abdominal Pain, Diarrhea, Nausea, Vomiting





- Genitourinary


Genitourinary: absent: Dysuria





- Integumentary


Integumentary: absent: Rash





- Neurological


Neurological: absent: Dizziness, Headaches





Past Patient History





- Infectious Disease


Hx of Infectious Diseases: None





- Past Social History


Smoking Status: Never Smoked





- CARDIAC


Hx Cardiac Disorders: Yes


Hx Hypercholesterolemia: Yes


Hx Hypertension: Yes





- PULMONARY


Hx Tuberculosis: No





- NEUROLOGICAL


Hx Seizures: Yes (new onset 6/14/18)





- HEMATOLOGICAL/ONCOLOGICAL


Hx Human Immunodeficiency Virus (HIV): No





- MUSCULOSKELETAL/RHEUMATOLOGICAL


Hx Falls: Yes





- GENITOURINARY/GYNECOLOGICAL


Hx Sexually Transmitted Disorders: No





- PSYCHIATRIC


Hx Anxiety: Yes


Hx Bipolar Disorder: Yes


Hx Substance Use: No





- SURGICAL HISTORY


Hx Surgeries: Yes


Other/Comment: cardiac sx as a child





- ANESTHESIA


Hx Anesthesia: Yes


Hx Anesthesia Reactions: No





Meds


Allergies/Adverse Reactions: 


 Allergies











Allergy/AdvReac Type Severity Reaction Status Date / Time


 


No Known Allergies Allergy   Verified 06/14/18 15:33














Physical Exam





- Constitutional


Appears: Non-toxic, No Acute Distress





- Head Exam


Head Exam: ATRAUMATIC, NORMAL INSPECTION, NORMOCEPHALIC





- Eye Exam


Eye Exam: Normal appearance





- ENT Exam


ENT Exam: Mucous Membranes Moist


Additional comments: 





self inflicted laceration of tongue on right side s/p seizure





- Neck Exam


Neck exam: Positive for: Full Rom.  Negative for: Tenderness





- Respiratory Exam


Respiratory Exam: Clear to Auscultation Bilateral.  absent: Accessory Muscle Use

, Decreased Breath Sounds, Rales, Rhonchi, Wheezes, Respiratory Distress





- Cardiovascular Exam


Cardiovascular Exam: REGULAR RHYTHM.  absent: Tachycardia





- GI/Abdominal Exam


GI & Abdominal Exam: Normal Bowel Sounds, Soft.  absent: Distended, Tenderness





- Extremities Exam


Extremities exam: Negative for: calf tenderness





- Neurological Exam


Neurological exam: Alert, CN II-XII Intact, Normal Gait, Oriented x3





- Skin


Skin Exam: Dry, Normal Color, Warm





Results





- Vital Signs


Recent Vital Signs: 





 Last Vital Signs











Temp  98.6 F   06/15/18 07:58


 


Pulse  83   06/15/18 08:11


 


Resp  18   06/15/18 07:58


 


BP  140/95 H  06/15/18 08:11


 


Pulse Ox  98   06/15/18 07:58














- Labs


Result Diagrams: 


 06/15/18 04:50





 06/15/18 04:50


Labs: 





 Laboratory Results - last 24 hr











  06/14/18 06/14/18 06/14/18





  15:56 16:30 16:30


 


WBC    8.3


 


RBC    4.71


 


Hgb    14.6


 


Hct    42.3


 


MCV    89.8


 


MCH    31.1 H


 


MCHC    34.6


 


RDW    16.6 H


 


Plt Count    132


 


MPV    7.4


 


Neut % (Auto)    91.1 H


 


Lymph % (Auto)    2.6 L


 


Mono % (Auto)    6.0


 


Eos % (Auto)    0.0


 


Baso % (Auto)    0.3


 


Neut # (Auto)    7.5 H


 


Lymph # (Auto)    0.2 L


 


Mono # (Auto)    0.5


 


Eos # (Auto)    0.0


 


Baso # (Auto)    0.0


 


Neutrophils % (Manual)    86 H


 


Band Neutrophils %    4 H


 


Lymphocytes % (Manual)    4 L


 


Monocytes % (Manual)    6


 


Smudge Cells    Present


 


Toxic Granulation    Present


 


Platelet Estimate    Normal


 


Hypochromasia (manual)    Slight


 


PT   


 


INR   


 


APTT   


 


Sodium   131 L 


 


Potassium   3.3 L 


 


Chloride   90 L 


 


Carbon Dioxide   28 


 


Anion Gap   16 


 


BUN   9 


 


Creatinine   0.9 


 


Est GFR ( Amer)   > 60 


 


Est GFR (Non-Af Amer)   > 60 


 


POC Glucose (mg/dL)  196 H  


 


Random Glucose   167 H 


 


Calcium   9.4 


 


Total Bilirubin   3.8 H 


 


AST   116 H D 


 


ALT   64 


 


Alkaline Phosphatase   108 


 


Troponin I   0.1220 H* 


 


Total Protein   8.8 H 


 


Albumin   4.9 


 


Globulin   3.9 


 


Albumin/Globulin Ratio   1.3 


 


Triglycerides   


 


Cholesterol   


 


LDL Cholesterol Direct   


 


HDL Cholesterol   


 


Vitamin B12   


 


Thyroxine (T4)   


 


Total T3   


 


TSH 3rd Generation   


 


Urine Opiates Screen   


 


Urine Methadone Screen   


 


Ur Barbiturates Screen   


 


Ur Phencyclidine Scrn   


 


Ur Amphetamines Screen   


 


U Benzodiazepines Scrn   


 


U Oth Cocaine Metabols   


 


U Cannabinoids Screen   


 


Alcohol, Quantitative   < 10 














  06/14/18 06/14/18 06/14/18





  16:30 16:50 23:03


 


WBC   


 


RBC   


 


Hgb   


 


Hct   


 


MCV   


 


MCH   


 


MCHC   


 


RDW   


 


Plt Count   


 


MPV   


 


Neut % (Auto)   


 


Lymph % (Auto)   


 


Mono % (Auto)   


 


Eos % (Auto)   


 


Baso % (Auto)   


 


Neut # (Auto)   


 


Lymph # (Auto)   


 


Mono # (Auto)   


 


Eos # (Auto)   


 


Baso # (Auto)   


 


Neutrophils % (Manual)   


 


Band Neutrophils %   


 


Lymphocytes % (Manual)   


 


Monocytes % (Manual)   


 


Smudge Cells   


 


Toxic Granulation   


 


Platelet Estimate   


 


Hypochromasia (manual)   


 


PT  10.8  


 


INR  1.0  


 


APTT  26.9  


 


Sodium   


 


Potassium   


 


Chloride   


 


Carbon Dioxide   


 


Anion Gap   


 


BUN   


 


Creatinine   


 


Est GFR ( Amer)   


 


Est GFR (Non-Af Amer)   


 


POC Glucose (mg/dL)   


 


Random Glucose   


 


Calcium   


 


Total Bilirubin   


 


AST   


 


ALT   


 


Alkaline Phosphatase   


 


Troponin I    0.0830


 


Total Protein   


 


Albumin   


 


Globulin   


 


Albumin/Globulin Ratio   


 


Triglycerides   


 


Cholesterol   


 


LDL Cholesterol Direct   


 


HDL Cholesterol   


 


Vitamin B12   


 


Thyroxine (T4)   


 


Total T3   


 


TSH 3rd Generation   


 


Urine Opiates Screen   Negative 


 


Urine Methadone Screen   Negative 


 


Ur Barbiturates Screen   Negative 


 


Ur Phencyclidine Scrn   Negative 


 


Ur Amphetamines Screen   Negative 


 


U Benzodiazepines Scrn   Negative 


 


U Oth Cocaine Metabols   Negative 


 


U Cannabinoids Screen   Negative 


 


Alcohol, Quantitative   














  06/15/18 06/15/18 06/15/18





  04:50 04:50 05:47


 


WBC  6.2  


 


RBC  4.71  


 


Hgb  14.5  


 


Hct  42.8  


 


MCV  90.9  


 


MCH  30.9  


 


MCHC  34.0  


 


RDW  16.1 H  


 


Plt Count  130  


 


MPV   


 


Neut % (Auto)   


 


Lymph % (Auto)   


 


Mono % (Auto)   


 


Eos % (Auto)   


 


Baso % (Auto)   


 


Neut # (Auto)   


 


Lymph # (Auto)   


 


Mono # (Auto)   


 


Eos # (Auto)   


 


Baso # (Auto)   


 


Neutrophils % (Manual)   


 


Band Neutrophils %   


 


Lymphocytes % (Manual)   


 


Monocytes % (Manual)   


 


Smudge Cells   


 


Toxic Granulation   


 


Platelet Estimate   


 


Hypochromasia (manual)   


 


PT   


 


INR   


 


APTT   


 


Sodium   134 


 


Potassium   2.9 L 


 


Chloride   96 L 


 


Carbon Dioxide   25 


 


Anion Gap   16 


 


BUN   7 L 


 


Creatinine   0.8 


 


Est GFR ( Amer)   > 60 


 


Est GFR (Non-Af Amer)   > 60 


 


POC Glucose (mg/dL)   


 


Random Glucose   90 


 


Calcium   9.3 


 


Total Bilirubin   3.9 H 


 


AST   111 H 


 


ALT   56 


 


Alkaline Phosphatase   93 


 


Troponin I    0.0430


 


Total Protein   8.2 


 


Albumin   4.5 


 


Globulin   3.7 


 


Albumin/Globulin Ratio   1.2 


 


Triglycerides   78 


 


Cholesterol   275 H 


 


LDL Cholesterol Direct   92 


 


HDL Cholesterol   140 H 


 


Vitamin B12   434 


 


Thyroxine (T4)   6.42 


 


Total T3   0.859 L 


 


TSH 3rd Generation   3.94 


 


Urine Opiates Screen   


 


Urine Methadone Screen   


 


Ur Barbiturates Screen   


 


Ur Phencyclidine Scrn   


 


Ur Amphetamines Screen   


 


U Benzodiazepines Scrn   


 


U Oth Cocaine Metabols   


 


U Cannabinoids Screen   


 


Alcohol, Quantitative   














Assessment & Plan


(1) New onset seizure


Status: Acute   





(2) Hypokalemia


Status: Acute   





(3) HTN (hypertension)


Status: Chronic   





(4) Bipolar disorder


Status: Chronic   





- Assessment and Plan (Free Text)


Plan: 





afebrile, non-tachycardic, normotensive


Neurology consult ordered


Cardiology consult ordered


EKG: sinus tachycardia, no acute ST elevation/depression


CXR: no active cardiolpulmonary disease process


CT head: no acute intracranial hemorrhage or abnormality


CT cervical spine: no acute fracture or trauma


CT soft tissue neck: mild bilateral lymphadenopathy


CBC and PT/INR/aPTT WNL


CMP: 134/2.9, 96/25, 7/0.8, glucose 90, [id number], ALT 56, alk phos 93


troponin 1st 0.1220, 2nd 0.0830, 3rd 0.0430


CPK: 655


alcohol level <10


urine toxicology negative 


phenytoin 100 mg IV Q8h 


IVF NS 1L @ 80 mL/hr


c/w home medication


f/u CBC, CMP, CPK, phenytoin level in AM


prophylactic measures: DVT SCDs


monitor for acute changes


seizure precautions


monitor for alcohol withdrawal

## 2018-06-16 LAB
ALBUMIN SERPL-MCNC: 3.9 G/DL (ref 3.5–5)
ALBUMIN/GLOB SERPL: 1.2 {RATIO} (ref 1–2.1)
ALT SERPL-CCNC: 44 U/L (ref 21–72)
AST SERPL-CCNC: 68 U/L (ref 17–59)
BACTERIA #/AREA URNS HPF: (no result) /[HPF]
BILIRUB UR-MCNC: NEGATIVE MG/DL
BUN SERPL-MCNC: 8 MG/DL (ref 9–20)
CALCIUM SERPL-MCNC: 8.9 MG/DL (ref 8.4–10.2)
COLOR UR: YELLOW
ERYTHROCYTE [DISTWIDTH] IN BLOOD BY AUTOMATED COUNT: 16 % (ref 11.5–14.5)
GFR NON-AFRICAN AMERICAN: > 60
GLUCOSE UR STRIP-MCNC: (no result) MG/DL
HGB BLD-MCNC: 13.2 G/DL (ref 12–18)
LEUKOCYTE ESTERASE UR-ACNC: (no result) LEU/UL
MCH RBC QN AUTO: 31.6 PG (ref 27–31)
MCHC RBC AUTO-ENTMCNC: 34.7 G/DL (ref 33–37)
MCV RBC AUTO: 91.1 FL (ref 80–94)
PH UR STRIP: 6 [PH] (ref 5–8)
PLATELET # BLD: 100 K/UL (ref 130–400)
PROT UR STRIP-MCNC: NEGATIVE MG/DL
RBC # BLD AUTO: 4.18 MIL/UL (ref 4.4–5.9)
RBC # UR STRIP: (no result) /UL
SP GR UR STRIP: 1.01 (ref 1–1.03)
URINE AMORPHOUS SEDIMENT: (no result) /UL
URINE CLARITY: CLEAR
UROBILINOGEN UR-MCNC: (no result) MG/DL (ref 0.2–1)
WBC # BLD AUTO: 6.1 K/UL (ref 4.8–10.8)

## 2018-06-16 RX ADMIN — PHENYTOIN SODIUM SCH MG: 50 INJECTION INTRAMUSCULAR; INTRAVENOUS at 00:14

## 2018-06-16 RX ADMIN — Medication SCH ML: at 03:08

## 2018-06-16 RX ADMIN — POTASSIUM CHLORIDE SCH MEQ: 20 TABLET, EXTENDED RELEASE ORAL at 09:10

## 2018-06-16 RX ADMIN — PHENYTOIN SODIUM SCH MG: 50 INJECTION INTRAMUSCULAR; INTRAVENOUS at 09:12

## 2018-06-16 RX ADMIN — PHENYTOIN SODIUM SCH MG: 50 INJECTION INTRAMUSCULAR; INTRAVENOUS at 17:57

## 2018-06-16 RX ADMIN — Medication SCH ML: at 09:38

## 2018-06-16 RX ADMIN — OMEGA-3-ACID ETHYL ESTERS SCH GM: 900 CAPSULE, LIQUID FILLED ORAL at 21:53

## 2018-06-16 RX ADMIN — Medication SCH ML: at 16:56

## 2018-06-16 RX ADMIN — POTASSIUM CHLORIDE SCH MEQ: 20 TABLET, EXTENDED RELEASE ORAL at 17:56

## 2018-06-16 RX ADMIN — CLINDAMYCIN IN 5 PERCENT DEXTROSE SCH MLS/HR: 12 INJECTION, SOLUTION INTRAVENOUS at 17:55

## 2018-06-16 RX ADMIN — CLINDAMYCIN IN 5 PERCENT DEXTROSE SCH MLS/HR: 12 INJECTION, SOLUTION INTRAVENOUS at 09:33

## 2018-06-16 RX ADMIN — OMEGA-3-ACID ETHYL ESTERS SCH GM: 900 CAPSULE, LIQUID FILLED ORAL at 09:10

## 2018-06-16 RX ADMIN — Medication SCH ML: at 21:54

## 2018-06-16 NOTE — PN
DATE:  06/16/2018



SUBJECTIVE:  The patient seen and examined.  Interim events noted. 

Consults noted and appreciated.  The patient remains in progressive care

unit on telemetry monitoring.  The patient is sleeping, arousable, but does

not want to get into conversation at this time.  Denies any specific

complaint.  No seizures.  No specific issue reported by nursing.



PHYSICAL EXAMINATION:

GENERAL:  The patient is in no acute distress.

VITAL SIGNS:  Stable.

HEART:  S1 and S2, normal and regular.

LUNGS:  Good bilateral air exchange.

ABDOMEN:  Soft, nontender.

EXTREMITIES:  No edema.  No calf swelling.  No tenderness.  No acute

ischemia.

CNS:  Exam is essentially unchanged.



_____ data reviewed.  Telemetry monitoring does not show significant

arrhythmias.



ASSESSMENT AND PLAN:  Overall, the patient's general medical condition is

stable.  Plan as ordered.







__________________________________________

Artie Jiménez MD



DD:  06/16/2018 8:33:38

DT:  06/16/2018 8:34:43

Job # [id number]

## 2018-06-17 LAB
ALBUMIN SERPL-MCNC: 4.2 G/DL (ref 3.5–5)
ALBUMIN/GLOB SERPL: 1.1 {RATIO} (ref 1–2.1)
ALT SERPL-CCNC: 44 U/L (ref 21–72)
AST SERPL-CCNC: 65 U/L (ref 17–59)
BUN SERPL-MCNC: 8 MG/DL (ref 9–20)
CALCIUM SERPL-MCNC: 9.2 MG/DL (ref 8.4–10.2)
ERYTHROCYTE [DISTWIDTH] IN BLOOD BY AUTOMATED COUNT: 15.7 % (ref 11.5–14.5)
GFR NON-AFRICAN AMERICAN: > 60
HGB BLD-MCNC: 13.5 G/DL (ref 12–18)
MCH RBC QN AUTO: 31.5 PG (ref 27–31)
MCHC RBC AUTO-ENTMCNC: 33.9 G/DL (ref 33–37)
MCV RBC AUTO: 93 FL (ref 80–94)
PLATELET # BLD: 130 K/UL (ref 130–400)
RBC # BLD AUTO: 4.28 MIL/UL (ref 4.4–5.9)
WBC # BLD AUTO: 6.1 K/UL (ref 4.8–10.8)

## 2018-06-17 RX ADMIN — POTASSIUM CHLORIDE SCH MEQ: 20 TABLET, EXTENDED RELEASE ORAL at 16:56

## 2018-06-17 RX ADMIN — OMEGA-3-ACID ETHYL ESTERS SCH GM: 900 CAPSULE, LIQUID FILLED ORAL at 09:10

## 2018-06-17 RX ADMIN — Medication SCH ML: at 10:30

## 2018-06-17 RX ADMIN — CLINDAMYCIN IN 5 PERCENT DEXTROSE SCH MLS/HR: 12 INJECTION, SOLUTION INTRAVENOUS at 01:07

## 2018-06-17 RX ADMIN — CLINDAMYCIN IN 5 PERCENT DEXTROSE SCH MLS/HR: 12 INJECTION, SOLUTION INTRAVENOUS at 16:55

## 2018-06-17 RX ADMIN — POTASSIUM CHLORIDE SCH MEQ: 20 TABLET, EXTENDED RELEASE ORAL at 09:12

## 2018-06-17 RX ADMIN — PHENYTOIN SODIUM SCH MG: 50 INJECTION INTRAMUSCULAR; INTRAVENOUS at 01:07

## 2018-06-17 RX ADMIN — PHENYTOIN SODIUM SCH MG: 50 INJECTION INTRAMUSCULAR; INTRAVENOUS at 17:25

## 2018-06-17 RX ADMIN — Medication SCH: at 04:37

## 2018-06-17 RX ADMIN — PHENYTOIN SODIUM SCH MG: 50 INJECTION INTRAMUSCULAR; INTRAVENOUS at 09:10

## 2018-06-17 RX ADMIN — OMEGA-3-ACID ETHYL ESTERS SCH GM: 900 CAPSULE, LIQUID FILLED ORAL at 21:44

## 2018-06-17 RX ADMIN — CLINDAMYCIN IN 5 PERCENT DEXTROSE SCH MLS/HR: 12 INJECTION, SOLUTION INTRAVENOUS at 09:13

## 2018-06-17 RX ADMIN — Medication SCH ML: at 16:55

## 2018-06-17 RX ADMIN — Medication SCH ML: at 21:44

## 2018-06-17 NOTE — MRI
PROCEDURE:  MRI BRAIN WITH AND WITHOUT CONTRAST



HISTORY:

Seizure



COMPARISON:

None. 



TECHNIQUE:

Multiplanar, multisequence MR images of the brain were obtained with 

and without intravenous contrast enhancement.



FINDINGS:



HEMORRHAGE:

None



DWI:

No evidence of an acute or early subacute infarction.



BRAIN PARENCHYMA:

Although there is no delete focal signal abnormality in the gray or 

white matter structures above or below the tentorium, the ventricular 

sulcal and cisternal spaces are mildly expanding may indicate an 

element of atrophy.  Consider possible HIV infection or chronic 

seizures.



ENHANCEMENT:

No abnormal intracranial enhancement.



VENTRICLES:

Unremarkable. No hydrocephalus.



CRANIUM:

Unremarkable.



ORBITS:

Grossly unremarkable.



PARANASAL SINUSES/MASTOIDS:

Clear



VASCULAR SYSTEM:

Skull base flow voids intact.



OTHER FINDINGS:

None .



IMPRESSION:

Limited diffuse cerebral atrophy may indicate chronic viral infection 

including HIV with chronic seizures also possibility.  Remainder of 

the examination appears unremarkable.

## 2018-06-17 NOTE — PN
DATE:  06/17/2018



SUBJECTIVE:  The patient is seen and examined.  Interim events noted. 

Consults noted and appreciated.  Neurology followup and intervention noted

and appreciated.  The patient remains in progressive care unit.  The

patient is sleeping, arousable, does not follow _____ does not want to get

into conversation at this time.  Denied any specific complaints.  No

seizures.  No specific issue reported by nursing staff.



PHYSICAL EXAMINATION:

GENERAL:  The patient is no acute distress.

VITAL SIGNS:  Stable.

HEART:  S1 and S2.  Normal and regular.

LUNGS:  Good bilateral air exchange.

ABDOMEN:  Soft and nontender.

EXTREMITIES:  No edema.  No calf swelling.  No tenderness.  No acute

ischemia.

CNS:  Exam is essentially unchanged.



DIAGNOSTIC DATA:  Available diagnostic data reviewed _____.



ASSESSMENT AND PLAN:  Overall, the patient's general medical condition is

stable.  Plan as ordered.







__________________________________________

Artie Jiménez MD



DD:  06/17/2018 7:39:17

DT:  06/17/2018 9:49:21

Job # [id number]

## 2018-06-18 RX ADMIN — POTASSIUM CHLORIDE SCH MEQ: 20 TABLET, EXTENDED RELEASE ORAL at 17:34

## 2018-06-18 RX ADMIN — PHENYTOIN SODIUM SCH MG: 50 INJECTION INTRAMUSCULAR; INTRAVENOUS at 09:22

## 2018-06-18 RX ADMIN — Medication SCH ML: at 16:34

## 2018-06-18 RX ADMIN — PHENYTOIN SODIUM SCH MG: 50 INJECTION INTRAMUSCULAR; INTRAVENOUS at 00:46

## 2018-06-18 RX ADMIN — CLINDAMYCIN IN 5 PERCENT DEXTROSE SCH MLS/HR: 12 INJECTION, SOLUTION INTRAVENOUS at 00:51

## 2018-06-18 RX ADMIN — POTASSIUM CHLORIDE SCH MEQ: 20 TABLET, EXTENDED RELEASE ORAL at 09:23

## 2018-06-18 RX ADMIN — Medication SCH ML: at 09:21

## 2018-06-18 RX ADMIN — PHENYTOIN SODIUM SCH MG: 50 INJECTION INTRAMUSCULAR; INTRAVENOUS at 17:35

## 2018-06-18 RX ADMIN — Medication SCH ML: at 21:14

## 2018-06-18 RX ADMIN — OMEGA-3-ACID ETHYL ESTERS SCH GM: 900 CAPSULE, LIQUID FILLED ORAL at 20:16

## 2018-06-18 RX ADMIN — OMEGA-3-ACID ETHYL ESTERS SCH GM: 900 CAPSULE, LIQUID FILLED ORAL at 09:22

## 2018-06-18 RX ADMIN — Medication SCH: at 05:33

## 2018-06-18 NOTE — PN
DATE:  06/18/2018



SUBJECTIVE:  The patient seen and examined.  Interim events noted. 

Consults noted and appreciated.  MRI report reviewed.  The patient feels

okay.  Denies any specific complaint.  No chest pain.  No shortness of

breath.  No dizziness.  No seizures.



PHYSICAL EXAMINATION:

GENERAL:  The patient is in no acute distress.

VITAL SIGNS:  Stable.

HEART:  S1 and S2, normal and regular.

LUNGS:  Good bilateral air exchange.

ABDOMEN:  Soft, nontender.

EXTREMITIES:  No edema.  No calf swelling.  No tenderness.  No acute

ischemia.

CNS:  Exam is essentially unchanged.



DIAGNOSTIC DATA:  Available diagnostic data reviewed.  Telemetry monitoring

does not show significant arrhythmia.



ASSESSMENT AND PLAN:  Overall, the patient's general medical condition is

stable.  Plan as ordered.





__________________________________________

Artie Jiménez MD



DD:  06/18/2018 7:42:13

DT:  06/18/2018 7:43:25

Job # [id number]

## 2018-06-19 VITALS — SYSTOLIC BLOOD PRESSURE: 108 MMHG | DIASTOLIC BLOOD PRESSURE: 71 MMHG | RESPIRATION RATE: 16 BRPM | TEMPERATURE: 98.3 F

## 2018-06-19 VITALS — OXYGEN SATURATION: 100 %

## 2018-06-19 VITALS — HEART RATE: 80 BPM

## 2018-06-19 RX ADMIN — PHENYTOIN SODIUM SCH MG: 50 INJECTION INTRAMUSCULAR; INTRAVENOUS at 00:57

## 2018-06-19 RX ADMIN — Medication SCH ML: at 11:41

## 2018-06-19 RX ADMIN — POTASSIUM CHLORIDE SCH MEQ: 20 TABLET, EXTENDED RELEASE ORAL at 09:56

## 2018-06-19 RX ADMIN — Medication SCH ML: at 17:46

## 2018-06-19 RX ADMIN — PHENYTOIN SODIUM SCH MG: 50 INJECTION INTRAMUSCULAR; INTRAVENOUS at 17:42

## 2018-06-19 RX ADMIN — POTASSIUM CHLORIDE SCH MEQ: 20 TABLET, EXTENDED RELEASE ORAL at 17:42

## 2018-06-19 RX ADMIN — OMEGA-3-ACID ETHYL ESTERS SCH GM: 900 CAPSULE, LIQUID FILLED ORAL at 09:55

## 2018-06-19 RX ADMIN — Medication SCH ML: at 04:18

## 2018-06-19 RX ADMIN — PHENYTOIN SODIUM SCH MG: 50 INJECTION INTRAMUSCULAR; INTRAVENOUS at 09:55

## 2018-06-19 NOTE — PN
DATE:  06/19/2018



SUBJECTIVE:  The patient seen and examined.  Interim events noted.  The

patient remains in progressive care unit, on telemetry monitoring.  The

patient is sleeping, arousable, feels okay.  Denies any chest pain or

shortness of breath.



PHYSICAL EXAMINATION:

GENERAL:  The patient is in no acute distress.

VITAL SIGNS:  Stable.

HEART:  S1 and S2, normal and regular.

LUNGS:  Good bilateral air exchange.

ABDOMEN:  Soft, nontender.

EXTREMITIES:  No edema.  No calf swelling.  No tenderness.  No acute

ischemia.

CNS:  Exam is essentially unchanged.



DIAGNOSTIC DATA:  Available diagnostic data reviewed.  Overall, the

patient's general medical condition is stable.  The patient has tongue bite

which looks a little infected.



PLAN:  As ordered.







__________________________________________

Arite Jiménez MD



DD:  06/19/2018 7:58:50

DT:  06/19/2018 10:58:49

Job # [id number]

## 2018-06-19 NOTE — CP.PCM.PCO
Assessment/Plan





- Assessment and Plan (Free Text)


Assessment: 


Patient seen and examined


Vital signs reviewed and wnl.


Patient with no tremors noted. 


Pain to right side of tongue decreasing, less swollen.  


EEG normal as per Dr Grimm


Discussed plan with dr San, patient to dc home with no antiepileptics needed 

for alcohol withdrawal seizure.  


Plan for dc with follow up with Dr. Jiménez in 1 week.   


rx for thiamine, folic acid and viscous lidocaine given to patient. 





SW met with patient, alcohol rehab resources provided.

## 2018-07-19 ENCOUNTER — HOSPITAL ENCOUNTER (EMERGENCY)
Dept: HOSPITAL 14 - H.ER | Age: 38
LOS: 1 days | Discharge: HOME | End: 2018-07-20
Payer: MEDICARE

## 2018-07-19 DIAGNOSIS — E78.00: ICD-10-CM

## 2018-07-19 DIAGNOSIS — Z91.19: ICD-10-CM

## 2018-07-19 DIAGNOSIS — I10: ICD-10-CM

## 2018-07-19 DIAGNOSIS — F10.129: Primary | ICD-10-CM

## 2018-07-19 PROCEDURE — 99285 EMERGENCY DEPT VISIT HI MDM: CPT

## 2018-07-19 PROCEDURE — 82948 REAGENT STRIP/BLOOD GLUCOSE: CPT

## 2018-07-19 PROCEDURE — 96372 THER/PROPH/DIAG INJ SC/IM: CPT

## 2018-07-20 VITALS
OXYGEN SATURATION: 99 % | TEMPERATURE: 98.4 F | SYSTOLIC BLOOD PRESSURE: 114 MMHG | RESPIRATION RATE: 14 BRPM | DIASTOLIC BLOOD PRESSURE: 67 MMHG | HEART RATE: 89 BPM

## 2018-07-20 NOTE — ED PDOC
HPI: Psych/Substance Abuse


Chief Complaint (Provider): Psychiatric Evaluation


ED Caveat: Intoxicated


History Per: Patient, EMS, Family (mother)


History/Exam Limitations: intoxication


Onset/Duration Of Symptoms: Mins (prior to arrival)


Current Symptoms Are (Timing): Still Present


Additional Complaint(s): 


38 year old male presents to the ED via EMS for a psychiatric evaluation. Prior 

to arrival, his mother called 911 due to his intoxicated, and aggressive 

behavior at home. The mother told EMS upon arrival that patient is noncompliant 

with his psych medications, and the patient did admit to drinking alcohol. As 

per EMS, patient also possibly abused drugs as well. Patient has no physical 

complaints or reports of trauma at this time, but patient is not a good 

historian due to his intoxicated state.





PMD: Dr. Gamez





<Lena Anguiano - Last Filed: 18 05:52>





<Jhonatan Boo - Last Filed: 18 06:52>


Time Seen by Provider: 18 23:27


Chief Complaint (Nursing): Psychiatric Evaluation





Past Medical History


Reviewed: Historical Data, Nursing Documentation, Vital Signs


Vital Signs: 





 Last Vital Signs











Temp  98.2 F   18 23:22


 


Pulse  70   18 23:22


 


Resp  17   18 23:22


 


BP  140/89   18 23:22


 


Pulse Ox  95   18 23:22














- Medical History


PMH: Anxiety, Bipolar Disorder, HTN, Hypercholesterolemia





- Surgical History


Other surgeries: cardiac sx as a child





- Family History


Family History: States: Unknown Family Hx





- Social History


Current smoker - smoking cessation education provided: Yes


Alcohol: Social


Drugs: Denies





<Lena Anguiano - Last Filed: 18 05:52>


Vital Signs: 





 Last Vital Signs











Temp  98.2 F   18 23:22


 


Pulse  64   18 03:23


 


Resp  16   18 03:23


 


BP  107/55 L  18 03:23


 


Pulse Ox  95   18 05:53














<Jhonatan Boo - Last Filed: 18 06:52>





- Home Medications


Home Medications: 


 Ambulatory Orders











 Medication  Instructions  Recorded


 


Clonazepam 0.5 mg PO HS 18


 


Icosapent Ethyl [Vascepa] 2 gm PO Q12 18


 


Losartan/Hydrochlorothiazide 1 tab PO DAILY 18





[Hyzaar 100-12.5 Tablet]  


 


Risperidone [Risperdal] 2 mg PO DAILY 18


 


amLODIPine [Norvasc] 10 mg PO DAILY 18


 


Folic Acid 1 mg PO DAILY #30 tab 18


 


Lidocaine 2% Viscous 20 ml MM Q8 #1 bottle 18


 


Thiamine [Vitamin B1 Tab] 100 mg PO DAILY #30 tab 18














- Allergies


Allergies/Adverse Reactions: 


 Allergies











Allergy/AdvReac Type Severity Reaction Status Date / Time


 


No Known Allergies Allergy   Verified 18 15:33














Review of Systems


Review Of Systems: ROS cannot be obtained secondary to pt's inabilty to answer 

questions.


Psych: Positive for: Other (intoxication, possible drug abuse)





<Lena Anguiano - Last Filed: 18 05:52>





Physical Exam





- Reviewed


Nursing Documentation Reviewed: Yes


Vital Signs Reviewed: Yes





- Physical Exam


Comments: 


GENERAL APPEARANCE: Patient is awake, intoxicated, agitated, and uncooperative. 

Patient has slurred speech.


SKIN: Warm, dry; (-) cyanosis


HEAD: (-) scalp swelling, (-) scalp tenderness.


EYES: (+) bilateral conjunctival injection, (-) scleral icterus, (-) nystagmus.


ENMT: Mucous membranes moist. Airway patent: (-) stridor.


NECK: (-) tenderness, (-) stiffness, (-) lymphadenopathy.


HEART AND CARDIOVASCULAR: (-) irregularity; (-) murmur, (-) gallop.


CHEST AND RESPIRATORY: (-) rales, (-) rhonchi, (-) wheezes; breath sounds equal.


ABDOMEN: Soft, (-) distention, (-) tenderness, (-) guarding.


NEURO AND PSYCH: Mental status as above.


CNs: Intact. Pupils equal and reactive; EOMI; (-) facial asymmetry; tongue and 

uvula midline. Strength and DTRs symmetric. 





<Lena Anguiano - Last Filed: 18 05:52>





- ECG


O2 Sat by Pulse Oximetry: 95 (RA)


Pulse Ox Interpretation: Normal





<Lena Anguiano - Last Filed: 18 05:52>





Medical Decision Making


Medical Decision Making: 


Time: 23:35


Initial Impression: psychiatric evaluation, alcohol intoxication, possible 

substance abuse


Initial Plan:


-- Due to pt being uncooperative, physically threatening to the ED staff, and 

refusing to stay in ED room, security was called multiple times to bedside. At 

this time, 4 point restraints were ordered.


--Alcohol serum


--Urine drug screen


--Crisis evaluation


--Ativan 2 mg IM


--1:1 Observation


--Blood glucose level





0130


Accucheck: 104


Utox: Negative


Serum Alcohol: 281





0137


Patient sleeping comfortably in ED stretcher. 4 point restraints removed.





0320


Patient resting in stretcher comfortably. No acute distress noted. 


Pending clinical sobriety and crisis evaluation. 





0600


Patient awake, alert, but uncooperative with answering questions.


Continuation of care per Dr Boo.





--------------------------------------------------------------------------------

-----------------


Scribe Attestation:   


Documented by Rufina Neves, acting as a scribe for Lena Anguiano PA-C.





Provider Scribe Attestation:


All medical record entries made by the Scribe were at my direction and 

personally dictated by me. I have reviewed the chart and agree that the record 

accurately reflects my personal performance of the history, physical exam, 

medical decision making, and the department course for this patient. I have 

also personally directed, reviewed, and agree with the discharge instructions 

and disposition.





<Lena Anguiano - Last Filed: 18 05:52>


Medical Decision Makin


WIll endorse case to Dr. Brooks pending crisis eval.  Patient resting comfortably.





<Jhonatan Boo - Last Filed: 18 06:52>





Disposition





<Lena Anguiano - Last Filed: 18 05:52>





- Patient ED Disposition


Is Patient to be Admitted: Transfer of Care





- Disposition


Disposition: Transfer of Care


Disposition Time: 07:00


Patient Signed Over To: Marguerite Brooks


Handoff Comments: pending crisis eval





<Jhonatan Boo - Last Filed: 18 06:52>





- Clinical Impression


Clinical Impression: 


 Alcohol abuse








- Disposition


Referrals: 


Davion Garcia PA [Primary Care Provider] - 


Condition: STABLE


Forms:  [employer] (English)





Results





- Lab Results


Lab Results: 

















  18





  01:00 01:00 00:55


 


POC Glucose (mg/dL)    104


 


Urine Opiates Screen  Negative  


 


Urine Methadone Screen  Negative  


 


Ur Barbiturates Screen  Negative  


 


Ur Phencyclidine Scrn  Negative  


 


Ur Amphetamines Screen  Negative  


 


U Benzodiazepines Scrn  Negative  


 


U Oth Cocaine Metabols  Negative  


 


U Cannabinoids Screen  Negative  


 


Alcohol, Quantitative   281 H 














<Lena Anguiano - Last Filed: 18 05:52>





- Lab Results


Lab Results: 

















  18





  01:00 01:00 00:55


 


POC Glucose (mg/dL)    104


 


Urine Opiates Screen  Negative  


 


Urine Methadone Screen  Negative  


 


Ur Barbiturates Screen  Negative  


 


Ur Phencyclidine Scrn  Negative  


 


Ur Amphetamines Screen  Negative  


 


U Benzodiazepines Scrn  Negative  


 


U Oth Cocaine Metabols  Negative  


 


U Cannabinoids Screen  Negative  


 


Alcohol, Quantitative   281 H 














<Jhonatan Boo - Last Filed: 18 06:52>

## 2018-07-20 NOTE — ED PDOC
- ECG


O2 Sat by Pulse Oximetry: 95 (RA)





Medical Decision Making


Medical Decision Making: 





patient endorsed to me by Dr. Boo. Patient is pending crisis evaluation. 





10.00 a- patient seen by crisis and cleared for discharge. Patient awake, alert 

and cooperative





Disposition


Doctor Will See Patient In The: Office


Counseled Patient/Family Regarding: Diagnosis, Need For Followup





- Clinical Impression


Clinical Impression: 


 Alcohol abuse








- POA


Present On Arrival: None





- Disposition


Referrals: 


Davion Garcia PA [Primary Care Provider] - 


Disposition: Routine/Home


Disposition Time: 10:10


Condition: STABLE


Instructions:  Alcohol Abuse and Alcoholism (DC)


Forms:  [facility] (English)

## 2018-08-08 ENCOUNTER — HOSPITAL ENCOUNTER (EMERGENCY)
Dept: HOSPITAL 14 - H.ER | Age: 38
LOS: 1 days | Discharge: HOME | End: 2018-08-09
Payer: MEDICARE

## 2018-08-08 VITALS — OXYGEN SATURATION: 99 %

## 2018-08-08 DIAGNOSIS — E78.00: ICD-10-CM
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
## 2018-08-08 DIAGNOSIS — I10: ICD-10-CM

## 2018-08-08 DIAGNOSIS — F10.10: Primary | ICD-10-CM

## 2018-08-08 LAB
ALBUMIN SERPL-MCNC: 4.7 G/DL (ref 3.5–5)
ALBUMIN/GLOB SERPL: 1.3 {RATIO} (ref 1–2.1)
ALT SERPL-CCNC: 41 U/L (ref 21–72)
APAP SERPL-MCNC: < 10 UG/ML (ref 10–30)
AST SERPL-CCNC: 53 U/L (ref 17–59)
BASOPHILS # BLD AUTO: 0 K/UL (ref 0–0.2)
BASOPHILS NFR BLD: 1.4 % (ref 0–2)
BUN SERPL-MCNC: 12 MG/DL (ref 9–20)
CALCIUM SERPL-MCNC: 8.4 MG/DL (ref 8.4–10.2)
EOSINOPHIL # BLD AUTO: 0 K/UL (ref 0–0.7)
EOSINOPHIL NFR BLD: 0.5 % (ref 0–4)
ERYTHROCYTE [DISTWIDTH] IN BLOOD BY AUTOMATED COUNT: 14 % (ref 11.5–14.5)
GFR NON-AFRICAN AMERICAN: > 60
HGB BLD-MCNC: 13.7 G/DL (ref 12–18)
LYMPHOCYTES # BLD AUTO: 1.3 K/UL (ref 1–4.3)
LYMPHOCYTES NFR BLD AUTO: 53.2 % (ref 20–40)
MCH RBC QN AUTO: 30.2 PG (ref 27–31)
MCHC RBC AUTO-ENTMCNC: 34.6 G/DL (ref 33–37)
MCV RBC AUTO: 87.3 FL (ref 80–94)
MONOCYTES # BLD: 0.2 K/UL (ref 0–0.8)
MONOCYTES NFR BLD: 6.6 % (ref 0–10)
NEUTROPHILS # BLD: 1 K/UL (ref 1.8–7)
NEUTROPHILS NFR BLD AUTO: 38.3 % (ref 50–75)
NRBC BLD AUTO-RTO: 0.1 % (ref 0–0)
PLATELET # BLD: 226 K/UL (ref 130–400)
PMV BLD AUTO: 6.7 FL (ref 7.2–11.7)
RBC # BLD AUTO: 4.53 MIL/UL (ref 4.4–5.9)
SALICYLATES SERPL-MCNC: < 1 MG/DL
WBC # BLD AUTO: 2.5 K/UL (ref 4.8–10.8)

## 2018-08-08 PROCEDURE — 85025 COMPLETE CBC W/AUTO DIFF WBC: CPT

## 2018-08-08 PROCEDURE — 70450 CT HEAD/BRAIN W/O DYE: CPT

## 2018-08-08 PROCEDURE — 80053 COMPREHEN METABOLIC PANEL: CPT

## 2018-08-08 PROCEDURE — 96374 THER/PROPH/DIAG INJ IV PUSH: CPT

## 2018-08-08 PROCEDURE — 99285 EMERGENCY DEPT VISIT HI MDM: CPT

## 2018-08-08 PROCEDURE — 96372 THER/PROPH/DIAG INJ SC/IM: CPT

## 2018-08-08 NOTE — ED PDOC
HPI: Psych/Substance Abuse


Time Seen by Provider: 08/08/18 15:50


Chief Complaint (Nursing): Seizure


Chief Complaint (Provider): Seizure


ED Caveat: Intoxicated


History Per: Family (Mother)


History/Exam Limitations: no limitations


Onset/Duration Of Symptoms: Mins


Current Symptoms Are (Timing): Still Present


Modifying Factor(s): Alcohol


Additional History Per: Patient


Additional Complaint(s): 


37 y/o male with a PMHx of Seizure disorder, Anxiety, Bipolar Disorder, HTN, 

and Hypercholesterolemia brought in by [facility] for possible seizure. 

Mother contacted EMS stating the patient had a seizure. mother not present in 

the ER. Patient admits to excessive drinking. Patient appears agitated and 

violent in the ED upon arrival, and placed on 1:1.





PMD: No Provider





Past Medical History


Reviewed: Historical Data, Nursing Documentation, Vital Signs


Vital Signs: 





 Last Vital Signs











Temp  97.6 F   08/08/18 15:47


 


Pulse  106 H  08/08/18 15:47


 


Resp  16   08/08/18 15:47


 


BP  155/99 H  08/08/18 15:47


 


Pulse Ox  99   08/08/18 15:47














- Medical History


PMH: Anxiety, Bipolar Disorder, HTN, Hypercholesterolemia


   Denies: Diabetes, Hepatitis, HIV, Seizures, Sexually Transmitted Disease





- Surgical History


Surgical History: No Surg Hx





- Family History


Family History: States: Unknown Family Hx





- Social History


Current smoker - smoking cessation education provided: No


Alcohol: None


Drugs: Denies





- Immunization History


Hx Tetanus Toxoid Vaccination: No


Hx Influenza Vaccination: No


Hx Pneumococcal Vaccination: No





- Home Medications


Home Medications: 


 Ambulatory Orders











 Medication  Instructions  Recorded


 


Clonazepam 0.5 mg PO HS 06/14/18


 


Icosapent Ethyl [Vascepa] 2 gm PO Q12 06/14/18


 


Losartan/Hydrochlorothiazide 1 tab PO DAILY 06/14/18





[Hyzaar 100-12.5 Tablet]  


 


Risperidone [Risperdal] 2 mg PO DAILY 06/14/18


 


amLODIPine [Norvasc] 10 mg PO DAILY 06/14/18


 


Folic Acid 1 mg PO DAILY #30 tab 06/19/18


 


Lidocaine 2% Viscous 20 ml MM Q8 #1 bottle 06/19/18


 


Thiamine [Vitamin B1 Tab] 100 mg PO DAILY #30 tab 06/19/18














- Allergies


Allergies/Adverse Reactions: 


 Allergies











Allergy/AdvReac Type Severity Reaction Status Date / Time


 


No Known Allergies Allergy   Verified 06/14/18 15:33














Review of Systems


ROS Statement: Except As Marked, All Systems Reviewed And Found Negative


Neurological: Positive for: Seizures


Psych: Positive for: Other (EtOH intoxication)





Physical Exam





- Reviewed


Nursing Documentation Reviewed: Yes


Vital Signs Reviewed: Yes





- Physical Exam


Appears: Positive for: No Acute Distress (Agitated)


Head Exam: Positive for: ATRAUMATIC


Skin: Positive for: Normal Color, Warm, Dry


Eye Exam: Positive for: Normal appearance


ENT: Positive for: Normal ENT Inspection


Neck: Positive for: Normal, Painless ROM


Cardiovascular/Chest: Positive for: Regular Rate, Rhythm.  Negative for: Murmur

, Bradycardia, Tachycardia


Respiratory: Positive for: Normal Breath Sounds.  Negative for: Respiratory 

Distress


Gastrointestinal/Abdominal: Positive for: Normal Exam, Soft.  Negative for: 

Tenderness


Extremity: Positive for: Normal ROM.  Negative for: Deformity


Neurologic/Psych: Positive for: Alert, Other (agitated and combative).  

Negative for: Motor/Sensory Deficits





- Laboratory Results


Result Diagrams: 


 08/08/18 16:35





 08/08/18 16:35





- ECG


O2 Sat by Pulse Oximetry: 99 (RA)


Pulse Ox Interpretation: Normal





Medical Decision Making


Medical Decision Making: 


Time: 1600


Plan: AGITATION.POSSIBLE SEIZURE


-- Acetaminophen


-- Alcohol Serum


-- CMP


-- Urine Drug Screen


-- Salicylate


-- CBC with differentials


-- Ativan 2 mg IVP


-- Haldol 5 mg IM 


-- Restraint: Violent or harm to self/other as ordered


-- Urinalysis





-- Restraints applied due to patient's aggressive and violent behavior to 

ensure the safety of staff and self. 


-- Patient given medications to relieve agitation








Time: 1900


-- Patient endorsed to Dr. Boo, pending sobriety and crisis evaluation.


_______________________________________________________________________________


Scribe Attestation:


Documented by Vida Breaux acting as a scribe for Dr. Ryan Gibson MD.





Provider Scribe Attestation:


All medical record entries made by the Scribe were at my direction and 

personally dictated by me. I have reviewed the chart and agree that the record 

accurately reflects my personal performance of the history, physical exam, 

medical decision making, and the department course for this patient. I have 

also personally directed, reviewed, and agree with the discharge instructions 

and disposition.





Disposition





- Clinical Impression


Clinical Impression: 


 Alcohol abuse








- Patient ED Disposition


Is Patient to be Admitted: Transfer of Care





- Disposition


Referrals: 


Alcoholics Anonymous [Outside]


Disposition: Transfer of Care


Disposition Time: 19:00


Condition: GOOD


Instructions:  Alcohol Abuse and Alcoholism (DC)


Forms:  [facility] (English)


Patient Signed Over To: Jhonatan Boo

## 2018-08-08 NOTE — ED PDOC
- Laboratory Results


Result Diagrams: 


 08/08/18 16:35





 08/08/18 16:35





- ECG


O2 Sat by Pulse Oximetry: 99 (RA)


Pulse Ox Interpretation: Normal





Medical Decision Making


Medical Decision Making: 


Time: 1900


--Patient endorsed to me by Dr. Gibson, pending sobriety and crisis evaluation.





2200


No acute changes





0100


Patient starting to wake up, asking for water





0330


Patient awake, sober, alert. Seen by crisis, does not meet admission criteria, 

will be discharged with outpatient resources, Dr. Sen has cleared patient 

psychiatrically.  Medically stable for discharge.





_________________________________________________________________________


Scribe Attestation:


Documented by Vida Breaux acting as a scribe for Dr. Jhonatan Boo MD.





Provider Scribe Attestation:


All medical record entries made by the Scribe were at my direction and 

personally dictated by me. I have reviewed the chart and agree that the record 

accurately reflects my personal performance of the history, physical exam, 

medical decision making, and the department course for this patient. I have 

also personally directed, reviewed, and agree with the discharge instructions 

and disposition.





Disposition





- Clinical Impression


Clinical Impression: 


 Alcohol abuse








- POA


Present On Arrival: None





- Disposition


Referrals: 


Alcoholics Anonymous [Outside]


Disposition: Routine/Home


Disposition Time: 04:12


Condition: GOOD


Instructions:  Alcohol Abuse and Alcoholism (DC)


Forms:  [facility] (English)

## 2018-08-09 VITALS
HEART RATE: 95 BPM | TEMPERATURE: 98.2 F | RESPIRATION RATE: 18 BRPM | SYSTOLIC BLOOD PRESSURE: 128 MMHG | DIASTOLIC BLOOD PRESSURE: 78 MMHG

## 2018-08-19 ENCOUNTER — HOSPITAL ENCOUNTER (EMERGENCY)
Dept: HOSPITAL 14 - H.ER | Age: 38
Discharge: HOME | End: 2018-08-19
Payer: MEDICARE

## 2018-08-19 VITALS — RESPIRATION RATE: 18 BRPM

## 2018-08-19 VITALS — SYSTOLIC BLOOD PRESSURE: 128 MMHG | DIASTOLIC BLOOD PRESSURE: 68 MMHG

## 2018-08-19 VITALS — OXYGEN SATURATION: 99 %

## 2018-08-19 VITALS — HEART RATE: 80 BPM

## 2018-08-19 VITALS — TEMPERATURE: 98.7 F

## 2018-08-19 DIAGNOSIS — R56.9: ICD-10-CM

## 2018-08-19 DIAGNOSIS — F10.239: ICD-10-CM

## 2018-08-19 DIAGNOSIS — I10: ICD-10-CM

## 2018-08-19 DIAGNOSIS — R00.2: Primary | ICD-10-CM

## 2018-08-19 DIAGNOSIS — Z86.59: ICD-10-CM

## 2018-08-19 DIAGNOSIS — E78.00: ICD-10-CM

## 2018-08-19 LAB
ALBUMIN SERPL-MCNC: 4.9 G/DL (ref 3.5–5)
ALBUMIN/GLOB SERPL: 1.4 {RATIO} (ref 1–2.1)
ALT SERPL-CCNC: 32 U/L (ref 21–72)
AST SERPL-CCNC: 54 U/L (ref 17–59)
BASOPHILS # BLD AUTO: 0 K/UL (ref 0–0.2)
BASOPHILS NFR BLD: 0.5 % (ref 0–2)
BUN SERPL-MCNC: 24 MG/DL (ref 9–20)
CALCIUM SERPL-MCNC: 9.6 MG/DL (ref 8.4–10.2)
EOSINOPHIL # BLD AUTO: 0 K/UL (ref 0–0.7)
EOSINOPHIL NFR BLD: 0.8 % (ref 0–4)
ERYTHROCYTE [DISTWIDTH] IN BLOOD BY AUTOMATED COUNT: 14.7 % (ref 11.5–14.5)
GFR NON-AFRICAN AMERICAN: 49
HGB BLD-MCNC: 14.2 G/DL (ref 12–18)
LYMPHOCYTES # BLD AUTO: 0.7 K/UL (ref 1–4.3)
LYMPHOCYTES NFR BLD AUTO: 15.8 % (ref 20–40)
MCH RBC QN AUTO: 30.9 PG (ref 27–31)
MCHC RBC AUTO-ENTMCNC: 35.1 G/DL (ref 33–37)
MCV RBC AUTO: 88.1 FL (ref 80–94)
MONOCYTES # BLD: 0.6 K/UL (ref 0–0.8)
MONOCYTES NFR BLD: 13.9 % (ref 0–10)
NEUTROPHILS # BLD: 3.2 K/UL (ref 1.8–7)
NEUTROPHILS NFR BLD AUTO: 69 % (ref 50–75)
NRBC BLD AUTO-RTO: 0 % (ref 0–0)
PLATELET # BLD: 175 K/UL (ref 130–400)
PMV BLD AUTO: 7.5 FL (ref 7.2–11.7)
RBC # BLD AUTO: 4.59 MIL/UL (ref 4.4–5.9)
WBC # BLD AUTO: 4.6 K/UL (ref 4.8–10.8)

## 2018-08-19 PROCEDURE — 80053 COMPREHEN METABOLIC PANEL: CPT

## 2018-08-19 PROCEDURE — 85025 COMPLETE CBC W/AUTO DIFF WBC: CPT

## 2018-08-19 PROCEDURE — 71045 X-RAY EXAM CHEST 1 VIEW: CPT

## 2018-08-19 PROCEDURE — 96372 THER/PROPH/DIAG INJ SC/IM: CPT

## 2018-08-19 PROCEDURE — 99283 EMERGENCY DEPT VISIT LOW MDM: CPT

## 2018-08-19 NOTE — ED PDOC
HPI: Chest Pain


Time Seen by Provider: 08/19/18 12:11


Chief Complaint (Nursing): Palpitations


Chief Complaint (Provider): Palpitations


History Per: Patient


History/Exam Limitations: no limitations


Additional Complaint(s): 





Patient is a 37 y/o male who presents to the ED complaining of palpitations 

with associated tingling of hands and feet. Patient denies any past medical 

history however EMR review reveals history of chest pain, psych, and alcohol 

abuse. Patient states that he had several beers x4 days ago, Wednesday, but has 

not drank any alcohol or used any drugs since then. Patient states he came to 

the ED with the same symptoms in the past and was "given a little pill" that 

made him feel better but cannot recall what the medication was. Patient denies 

nausea, vomiting, or abdominal pain.





Past Medical History


Reviewed: Historical Data, Nursing Documentation, Vital Signs


Vital Signs: 


 Last Vital Signs











Temp  98.7 F   08/19/18 14:11


 


Pulse  80   08/19/18 14:11


 


Resp  18   08/19/18 14:11


 


BP  128/68   08/19/18 14:44


 


Pulse Ox  99   08/19/18 14:59














- Medical History


PMH: Anxiety, Bipolar Disorder, HTN, Hypercholesterolemia


   Denies: Diabetes, Hepatitis, HIV, Seizures, Sexually Transmitted Disease





- Family History


Family History: States: Unknown Family Hx





- Immunization History


Hx Tetanus Toxoid Vaccination: No


Hx Influenza Vaccination: No


Hx Pneumococcal Vaccination: No





- Home Medications


Home Medications: 


 Ambulatory Orders











 Medication  Instructions  Recorded


 


Clonazepam 0.5 mg PO HS 06/14/18


 


Icosapent Ethyl [Vascepa] 2 gm PO Q12 06/14/18


 


Losartan/Hydrochlorothiazide 1 tab PO DAILY 06/14/18





[Hyzaar 100-12.5 Tablet]  


 


Risperidone [Risperdal] 2 mg PO DAILY 06/14/18


 


amLODIPine [Norvasc] 10 mg PO DAILY 06/14/18


 


Folic Acid 1 mg PO DAILY #30 tab 06/19/18


 


Lidocaine 2% Viscous 20 ml MM Q8 #1 bottle 06/19/18


 


Thiamine [Vitamin B1 Tab] 100 mg PO DAILY #30 tab 06/19/18














- Allergies


Allergies/Adverse Reactions: 


 Allergies











Allergy/AdvReac Type Severity Reaction Status Date / Time


 


No Known Allergies Allergy   Verified 06/14/18 15:33














Review of Systems


ROS Statement: Except As Marked, All Systems Reviewed And Found Negative


Constitutional: Negative for: Fever


Cardiovascular: Positive for: Palpitations


Gastrointestinal: Negative for: Nausea, Vomiting, Abdominal Pain


Neurological: Positive for: Other (tingling)





Physical Exam





- Reviewed


Nursing Documentation Reviewed: Yes


Vital Signs Reviewed: Yes





- Physical Exam


Appears: Positive for: Well, No Acute Distress


Head Exam: Positive for: ATRAUMATIC, NORMOCEPHALIC


Skin: Positive for: Normal Color, Warm, Dry


Eye Exam: Positive for: EOMI, Normal appearance, PERRL


Neck: Positive for: Normal, Painless ROM, Supple


Cardiovascular/Chest: Positive for: Regular Rate, Rhythm.  Negative for: Murmur


Respiratory: Positive for: Normal Breath Sounds.  Negative for: Respiratory 

Distress


Gastrointestinal/Abdominal: Positive for: Normal Exam, Soft.  Negative for: 

Tenderness


Back: Positive for: Normal Inspection.  Negative for: L CVA Tenderness, R CVA 

Tenderness, Vertebral Tenderness


Extremity: Positive for: Normal ROM.  Negative for: Pedal Edema, Deformity


Neurologic/Psych: Positive for: Alert, Oriented.  Negative for: Motor/Sensory 

Deficits, Other (tremors)





- Laboratory Results


Result Diagrams: 


 08/19/18 12:53





 08/19/18 12:53





- ECG


O2 Sat by Pulse Oximetry: 99 (RA)


Pulse Ox Interpretation: Normal





Medical Decision Making


Medical Decision Making: 





Time: 12:34


Impression: Possible withdrawal symptoms causing palpitations and feelings of 

discomfort. Patient is tolerating PO and will continue to give PO fluids. 

Zofran will be given for nausea. Will consider IV fluids and Librium if patient 

shows further signs of alcohol withdrawal. 


Initial Plan:


--Alcohol serum


--CMP


--Drug screen


--CBC w/ drugs


--CXR


--Zofran





1400 :  pt with improved palpitations and nausea but began to have slight 

tremor and tongue fasciculations.  Pt given PO librium.








15:00:  Pt with resolved tremor and states palpitations have resolved.  Vitals 

normal.  In depth with patient regarding drinking reveals pt has a tendency to 

binge drink for days straight and will stop abruptly with similar symptoms.  Pt

  states a few weeks ago he had a seizure as a result of alcohol withdrawal but 

was not started on any medication and never went for follow up.  PT has 

recently missed appointment's with his PMD and with his psychiatrist due to 

drinking.  The risk of continued drinking was discussed with the patient and he 

states he feels more motivated to stop drinking now and will see his doctors 

this week.





--------------------------------------------------------------------------------

-----


Scribe Attestation:


Documented by Maxi Howard, acting as a scribe for Lena Horne MD.





Provider Scribe Attestation:


All medical record entries made by the Scribe were at my direction and 

personally dictated by me. I have reviewed the chart and agree that the record 

accurately reflects my personal performance of the history, physical exam, 

medical decision making, and the department course for this patient. I have 

also personally directed, reviewed, and agree with the discharge instructions 

and disposition.





Disposition





- Clinical Impression


Clinical Impression: 


 Palpitations, Alcohol abuse, Alcohol withdrawal








- Disposition


Referrals: 


jenae vasquez [Other]


[facility] [Outside]


Disposition Time: 15:12


Condition: IMPROVED


Additional Instructions: 


Follow up with primary medical doctor and psychiatrist this week.  Increase 

fluid intake.  Do not use drugs or alcohol.  Return to the emergency department 

if symptoms worsen or new symptoms develop.


Instructions:  Palpitations, Alcohol Withdrawal, Alcohol Abuse and Alcoholism (

DC), Effects of Alcohol on Your Health


Forms:  [facility] (English)


Print Language: ENGLISH

## 2018-08-19 NOTE — RAD
Date of service: 



08/19/2018



HISTORY:

possible admission  



COMPARISON:

6/14/2018 



FINDINGS:



LUNGS:

No active pulmonary disease.



PLEURA:

No significant pleural effusion identified, no pneumothorax apparent.



CARDIOVASCULAR:

Normal.



OSSEOUS STRUCTURES:

No significant abnormalities.



VISUALIZED UPPER ABDOMEN:

Normal.



OTHER FINDINGS:

None.



IMPRESSION:

No active disease.

## 2018-11-05 ENCOUNTER — HOSPITAL ENCOUNTER (EMERGENCY)
Dept: HOSPITAL 14 - H.ER | Age: 38
LOS: 1 days | Discharge: HOME | End: 2018-11-06
Payer: MEDICARE

## 2018-11-05 DIAGNOSIS — Z00.8: ICD-10-CM

## 2018-11-05 DIAGNOSIS — I10: ICD-10-CM

## 2018-11-05 DIAGNOSIS — F10.129: Primary | ICD-10-CM

## 2018-11-05 DIAGNOSIS — Z86.59: ICD-10-CM
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
## 2018-11-05 DIAGNOSIS — Z79.899: ICD-10-CM

## 2018-11-05 LAB
BASOPHILS # BLD AUTO: 0.1 K/UL (ref 0–0.2)
BASOPHILS NFR BLD: 0.9 % (ref 0–2)
BILIRUB UR-MCNC: NEGATIVE MG/DL
BUN SERPL-MCNC: 13 MG/DL (ref 9–20)
CALCIUM SERPL-MCNC: 9.1 MG/DL (ref 8.4–10.2)
COLOR UR: COLORLESS
EOSINOPHIL # BLD AUTO: 0 K/UL (ref 0–0.7)
EOSINOPHIL NFR BLD: 0.6 % (ref 0–4)
ERYTHROCYTE [DISTWIDTH] IN BLOOD BY AUTOMATED COUNT: 14.7 % (ref 11.5–14.5)
GFR NON-AFRICAN AMERICAN: > 60
GLUCOSE UR STRIP-MCNC: (no result) MG/DL
HGB BLD-MCNC: 14.5 G/DL (ref 12–18)
LEUKOCYTE ESTERASE UR-ACNC: (no result) LEU/UL
LYMPHOCYTES # BLD AUTO: 2.9 K/UL (ref 1–4.3)
LYMPHOCYTES NFR BLD AUTO: 39.2 % (ref 20–40)
MCH RBC QN AUTO: 30.2 PG (ref 27–31)
MCHC RBC AUTO-ENTMCNC: 33.7 G/DL (ref 33–37)
MCV RBC AUTO: 89.6 FL (ref 80–94)
MONOCYTES # BLD: 1 K/UL (ref 0–0.8)
MONOCYTES NFR BLD: 13.2 % (ref 0–10)
NEUTROPHILS # BLD: 3.5 K/UL (ref 1.8–7)
NEUTROPHILS NFR BLD AUTO: 46.1 % (ref 50–75)
NRBC BLD AUTO-RTO: 0.1 % (ref 0–0)
PH UR STRIP: 7 [PH] (ref 5–8)
PLATELET # BLD: 358 K/UL (ref 130–400)
PMV BLD AUTO: 7.4 FL (ref 7.2–11.7)
PROT UR STRIP-MCNC: NEGATIVE MG/DL
RBC # BLD AUTO: 4.8 MIL/UL (ref 4.4–5.9)
RBC # UR STRIP: NEGATIVE /UL
SP GR UR STRIP: < 1.005 (ref 1–1.03)
URINE CLARITY: CLEAR
UROBILINOGEN UR-MCNC: (no result) MG/DL (ref 0.2–1)
WBC # BLD AUTO: 7.5 K/UL (ref 4.8–10.8)

## 2018-11-05 PROCEDURE — 80048 BASIC METABOLIC PNL TOTAL CA: CPT

## 2018-11-05 PROCEDURE — 81003 URINALYSIS AUTO W/O SCOPE: CPT

## 2018-11-05 PROCEDURE — 85025 COMPLETE CBC W/AUTO DIFF WBC: CPT

## 2018-11-06 VITALS — HEART RATE: 78 BPM | DIASTOLIC BLOOD PRESSURE: 68 MMHG | TEMPERATURE: 98.4 F | SYSTOLIC BLOOD PRESSURE: 117 MMHG

## 2018-11-06 VITALS — RESPIRATION RATE: 18 BRPM

## 2018-11-06 VITALS — OXYGEN SATURATION: 98 %

## 2018-11-06 NOTE — ED PDOC
- Laboratory Results


Result Diagrams: 


                                 11/05/18 21:29





                                 11/05/18 21:29





- ECG


O2 Sat by Pulse Oximetry: 98





- Progress


ED Course And Treament: 





Case endorsed to writer from Evelio HENYR pending sobriety and crisis eval





1:30


Patient sleeping; no distress





3:00


Patient awake, alert, oriented x3.


Patient evaluated by crisis worker; does not meet criteria for admission at this

time as per Dr. Sen


Patient requires no further intervention in the ED and is stable for discharge 

at this time


Return precautions given











Disposition





- Clinical Impression


Clinical Impression: 


 Alcohol use disorder, mild, abuse








- POA


Present On Arrival: None





- Disposition


Disposition: Routine/Home


Disposition Time: 03:19


Condition: IMPROVED


Instructions:  Alcohol Use - When Is Drinking a Problem?


Forms:  [facility] (English)

## 2019-01-03 ENCOUNTER — HOSPITAL ENCOUNTER (EMERGENCY)
Dept: HOSPITAL 14 - H.ER | Age: 39
LOS: 1 days | Discharge: HOME | End: 2019-01-04
Payer: MEDICARE

## 2019-01-03 VITALS — TEMPERATURE: 98 F | RESPIRATION RATE: 16 BRPM

## 2019-01-03 DIAGNOSIS — F10.129: Primary | ICD-10-CM
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
## 2019-01-03 DIAGNOSIS — Z79.899: ICD-10-CM

## 2019-01-03 DIAGNOSIS — I10: ICD-10-CM

## 2019-01-03 DIAGNOSIS — E78.00: ICD-10-CM

## 2019-01-03 DIAGNOSIS — F31.9: ICD-10-CM

## 2019-01-03 PROCEDURE — 96372 THER/PROPH/DIAG INJ SC/IM: CPT

## 2019-01-03 PROCEDURE — 82948 REAGENT STRIP/BLOOD GLUCOSE: CPT

## 2019-01-03 PROCEDURE — 99285 EMERGENCY DEPT VISIT HI MDM: CPT

## 2019-01-03 NOTE — ED PDOC
HPI: Psych/Substance Abuse


Time Seen by Provider: 01/03/19 20:12


Chief Complaint (Nursing): Alcohol Ingestion


Chief Complaint (Provider): Alcohol Ingestion


History Per: Patient


History/Exam Limitations: intoxication


Onset/Duration Of Symptoms: Hrs


Current Symptoms Are (Timing): Still Present


Additional Complaint(s): 


Patient is a 37 y/o male ETOH abuser with hx of DTs as well as anxiety, HTN, 

hypercholesterolemia, depression, and bipolar disorder who was brought in by the

ambulance for being intoxicated. Patient's mother called the ambulance because 

the patient was too drunk. Patient was uncooperative when trying to obtain 

history and was agitated during interview. The patient admitted to drinking 

EtOH. Patient denies CP, SOB, and any other physical complaints. In addition, 

patient adamantly denies drug use. Of note, patient is unsure if he took his 

blood pressure medication today. Per my discussion with patient's mother who 

called to discuss patient, he has been refusing to take his psych meds for over 

1 month stating "I would rather die". He has been drinking heavily and becomes 

very physically aggressive with her and others when he drinks. He has not bathed

in weeks and does not sleep. She feels that he is a harm to himself and 

potentially to others and that he needs help with his alcohol and psyh 

conditions. He has a hx of DTs. 





PCP: None Provided








Past Medical History


Reviewed: Historical Data, Nursing Documentation, Vital Signs


Vital Signs: 





                                Last Vital Signs











Temp  98.0 F   01/03/19 20:03


 


Pulse  94 H  01/03/19 20:03


 


Resp  16   01/03/19 20:03


 


BP  162/98 H  01/03/19 20:03


 


Pulse Ox  98   01/03/19 20:03














- Medical History


PMH: Anxiety, Bipolar Disorder, Depression, HTN, Hypercholesterolemia


   Denies: Diabetes, Hepatitis, HIV, Seizures, Sexually Transmitted Disease





- Surgical History


Surgical History: No Surg Hx





- Family History


Family History: States: Unknown Family Hx





- Social History


Alcohol: > 2 Drinks/Day


Drugs: Denies





- Immunization History


Hx Tetanus Toxoid Vaccination: No


Hx Influenza Vaccination: No


Hx Pneumococcal Vaccination: No





- Home Medications


Home Medications: 


                                Ambulatory Orders











 Medication  Instructions  Recorded


 


Clonazepam 0.5 mg PO HS 06/14/18


 


Icosapent Ethyl [Vascepa] 2 gm PO Q12 06/14/18


 


Losartan/Hydrochlorothiazide 1 tab PO DAILY 06/14/18





[Hyzaar 100-12.5 Tablet]  


 


Risperidone [Risperdal] 2 mg PO DAILY 06/14/18


 


amLODIPine [Norvasc] 10 mg PO DAILY 06/14/18


 


Folic Acid 1 mg PO DAILY #30 tab 06/19/18


 


Lidocaine 2% Viscous 20 ml MM Q8 #1 bottle 06/19/18


 


Thiamine [Vitamin B1 Tab] 100 mg PO DAILY #30 tab 06/19/18














- Allergies


Allergies/Adverse Reactions: 


                                    Allergies











Allergy/AdvReac Type Severity Reaction Status Date / Time


 


No Known Allergies Allergy   Verified 01/03/19 20:02














Review of Systems


ROS Statement: Except As Marked, All Systems Reviewed And Found Negative


Cardiovascular: Negative for: Chest Pain


Respiratory: Negative for: Shortness of Breath


Psych: Negative for: Suicidal ideation (or Homicidal Ideation), Other (Auditory 

Hallucinations)





Physical Exam





- Reviewed


Nursing Documentation Reviewed: Yes


Vital Signs Reviewed: Yes





- Physical Exam


Appears: Positive for: No Acute Distress


Head Exam: Positive for: ATRAUMATIC, NORMAL INSPECTION, NORMOCEPHALIC


Skin: Positive for: Normal Color


Eye Exam: Positive for: Conjunctival injection (Bilaterally)


ENT: Positive for: Normal ENT Inspection


Neck: Positive for: Normal


Cardiovascular/Chest: Positive for: Regular Rate, Rhythm


Respiratory: Positive for: Normal Breath Sounds


Gastrointestinal/Abdominal: Positive for: Normal Exam


Extremity: Positive for: Normal ROM


Neurologic/Psych: Positive for: Alert, Gait (Instability), Other (Slurred 

Speech).  Negative for: Oriented





- ECG


O2 Sat by Pulse Oximetry: 98 (RA)


Pulse Ox Interpretation: Normal





Medical Decision Making


Medical Decision Making: 


Time: 2026


Plan:


EKG


Alcohol Serum


Urine Drug Screen


U/A


Crisis Evaluation


Accucheck





Patient became increasingly agitated and aggressive towards staff and security 

guards.


Ativan 2 mg IM


Haldol 5 mg IM


4 point restraints ordered. 





Serum ETOH: 383 @ 20:50





01:30: Normal Saline 1L fluid bolus ordered. 





Patient signed out to MIR Gutierrez pending clinical sobriety and crisis 

evaluation.





 

--------------------------------------------------------------------------------


-----------------


Scribe Attestation:


Documented by Diogenes Avery, acting as a scribe for Vesta HESTER.


Provider Scribe Attestation:


All medical record entries made by the Scribe were at my direction and 

personally dictated by me. I have reviewed the chart and agree that the record 

accurately reflects my personal performance of the history, physical exam, 

medical decision making, and the department course for this patient. I have also

personally directed, reviewed, and agree with the discharge instructions and 

disposition.








Disposition





- Clinical Impression


Clinical Impression: 


 Alcohol abuse with intoxication, Bipolar disorder








- Patient ED Disposition


Is Patient to be Admitted: Transfer of Care





- Disposition


Disposition: Transfer of Care


Disposition Time: 01:30


Condition: FAIR


Forms:  [facility] (English)

## 2019-01-04 VITALS — SYSTOLIC BLOOD PRESSURE: 137 MMHG | DIASTOLIC BLOOD PRESSURE: 76 MMHG | OXYGEN SATURATION: 100 % | HEART RATE: 89 BPM

## 2019-01-04 NOTE — ED PDOC
- ECG


O2 Sat by Pulse Oximetry: 98 (RA)





- Progress


ED Course And Treament: 





Case endorsed to writer from David HENRY pending sobriety, crisis eval





1:30


Patient sleeping; no distress





3:00


Patient sleeping; no distress





4:30


Patient sleeping; no distress





6:00


Patient awake, alert, oriented x3.  Ambulating steady gait


Patient evaluated by crisis worker; does not meet criteria for admission at this

time as per Dr. Sen


Patient requires no further intervention in the ED and is stable for discharge 

at this time


Return precautions given

















Disposition





- Clinical Impression


Clinical Impression: 


 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








- POA


Present On Arrival: None





- Disposition


Disposition: Routine/Home


Disposition Time: 05:58


Condition: IMPROVED


Instructions:  Alcohol Use - When Is Drinking a Problem?

## 2019-02-08 ENCOUNTER — HOSPITAL ENCOUNTER (EMERGENCY)
Dept: HOSPITAL 14 - H.ER | Age: 39
LOS: 1 days | Discharge: HOME | End: 2019-02-09
Payer: MEDICARE

## 2019-02-08 VITALS — RESPIRATION RATE: 18 BRPM | OXYGEN SATURATION: 99 %

## 2019-02-08 DIAGNOSIS — F10.129: Primary | ICD-10-CM

## 2019-02-08 DIAGNOSIS — F41.9: ICD-10-CM

## 2019-02-08 DIAGNOSIS — Z79.899: ICD-10-CM

## 2019-02-08 DIAGNOSIS — E78.00: ICD-10-CM

## 2019-02-08 DIAGNOSIS — F31.9: ICD-10-CM

## 2019-02-08 DIAGNOSIS — I10: ICD-10-CM

## 2019-02-08 PROCEDURE — 99284 EMERGENCY DEPT VISIT MOD MDM: CPT

## 2019-02-08 PROCEDURE — 96372 THER/PROPH/DIAG INJ SC/IM: CPT

## 2019-02-08 NOTE — ED PDOC
HPI: Psych/Substance Abuse


Time Seen by Provider: 02/08/19 20:44


Chief Complaint (Nursing): Alcohol Ingestion


History Per: Patient


Additional Complaint(s): 





BIBA for aggressive behavior at home. Pt. admits to drinking alcohol (5-6 beers)

today. Offers no complaints. Denies SI/HI, hallucinations. 





Past Medical History


Reviewed: Historical Data, Nursing Documentation, Vital Signs


Vital Signs: 





                                Last Vital Signs











Temp  98.6 F   02/08/19 20:35


 


Pulse  78   02/08/19 20:35


 


Resp  18   02/08/19 20:35


 


BP  168/121 H  02/08/19 20:35


 


Pulse Ox  99   02/08/19 20:35














- Medical History


PMH: Anxiety, Bipolar Disorder, Depression, HTN, Hypercholesterolemia


   Denies: Diabetes, Hepatitis, HIV, Seizures, Sexually Transmitted Disease





- Surgical History


Surgical History: No Surg Hx





- Family History


Family History: States: No Known Family Hx





- Immunization History


Hx Tetanus Toxoid Vaccination: No


Hx Influenza Vaccination: No


Hx Pneumococcal Vaccination: No





- Home Medications


Home Medications: 


                                Ambulatory Orders











 Medication  Instructions  Recorded


 


amLODIPine [Norvasc] 10 mg PO DAILY 06/14/18


 


Thiamine [Vitamin B1 Tab] 100 mg PO DAILY #30 tab 06/19/18


 


Divalproex [Depakote ER] 1,000 mg PO DAILY 02/09/19


 


Naproxen [Naprosyn] 500 mg PO BID 02/09/19














- Allergies


Allergies/Adverse Reactions: 


                                    Allergies











Allergy/AdvReac Type Severity Reaction Status Date / Time


 


No Known Allergies Allergy   Verified 01/03/19 20:02














Review of Systems


ROS Statement: Except As Marked, All Systems Reviewed And Found Negative





Physical Exam





- Reviewed


Nursing Documentation Reviewed: Yes


Vital Signs Reviewed: Yes





- Physical Exam


Appears: Positive for: Well, Non-toxic, No Acute Distress


Head Exam: Positive for: ATRAUMATIC, NORMAL INSPECTION, NORMOCEPHALIC


Skin: Positive for: Normal Color, Warm.  Negative for: Rash


Eye Exam: Positive for: EOMI, Normal appearance, PERRL


ENT: Positive for: Normal ENT Inspection


Neck: Positive for: Normal, Painless ROM


Cardiovascular/Chest: Positive for: Regular Rate, Rhythm


Respiratory: Positive for: CNT, Normal Breath Sounds


Gastrointestinal/Abdominal: Positive for: Normal Exam, Soft.  Negative for: 

Tenderness


Back: Positive for: Normal Inspection


Extremity: Positive for: Normal ROM


Neurologic/Psych: Positive for: Alert, Oriented, Other (slurred speech, AOB).  

Negative for: Aphasia, Facial Droop





- ECG


O2 Sat by Pulse Oximetry: 99





- Progress


ED Course And Treament: 





Pt. attempting to leave ED with unsteady gait. Refusing to stay in ED room. 


Ativan 2mg IM, haldol 5mg IM, cardiac monitoring ordered.





On re-evaluation, pt. sleeping comfortably. Easily arousable. Gait, steady, 

unassisted. Offers no complaints at this time. Denies SI/HI, hallucinations. 





Repeat BP: 137/88 as per RN.





Disposition





- Clinical Impression


Clinical Impression: 


 Alcohol intoxication








- Patient ED Disposition


Is Patient to be Admitted: No





- Disposition


Disposition: Routine/Home


Disposition Time: 06:00


Condition: IMPROVED


Instructions:  Alcohol Abuse and Alcoholism (DC)


Forms:  [facility] (English)

## 2019-02-09 VITALS — SYSTOLIC BLOOD PRESSURE: 137 MMHG | HEART RATE: 74 BPM | TEMPERATURE: 98.4 F | DIASTOLIC BLOOD PRESSURE: 78 MMHG

## 2025-06-04 NOTE — RAD
HISTORY:

SOB  



COMPARISON:

No prior. 



FINDINGS:



LUNGS:

The lungs are clear.



PLEURA:

No significant pleural effusion identified, no pneumothorax apparent.



CARDIOVASCULAR:

Normal.



OSSEOUS STRUCTURES:

No significant abnormalities.



VISUALIZED UPPER ABDOMEN:

Normal.



OTHER FINDINGS:

There is severe gaseous distension of the left hemicolon.



IMPRESSION:

No active pulmonary disease.



Severe gaseous distension of the left hemicolon.  Clinical follow-up 

is advised.
Patient baseline mental status/Awake